# Patient Record
Sex: MALE | Race: BLACK OR AFRICAN AMERICAN | Employment: UNEMPLOYED | URBAN - METROPOLITAN AREA
[De-identification: names, ages, dates, MRNs, and addresses within clinical notes are randomized per-mention and may not be internally consistent; named-entity substitution may affect disease eponyms.]

---

## 2018-08-29 PROBLEM — I50.40 CHF (CONGESTIVE HEART FAILURE), NYHA CLASS III, UNSPECIFIED FAILURE CHRONICITY, COMBINED (HCC): Status: ACTIVE | Noted: 2018-08-29

## 2018-09-11 ENCOUNTER — TELEPHONE (OUTPATIENT)
Dept: CARDIOLOGY CLINIC | Age: 63
End: 2018-09-11

## 2018-09-11 NOTE — TELEPHONE ENCOUNTER
Returned call to Samia Sosa I am not sure what test the patient is talking about will talk with Dr. Teofilo Wu in am to see what the testing is and give Samia Sosa a call back tomorrow. Patient verbalized understanding of all information given.

## 2018-09-11 NOTE — TELEPHONE ENCOUNTER
Patients son called stating Dr. Madhavi Gregory wanted a test done on his father. He is not sure what kind of procedure. The son stated he does not go to his fathers doctor appointments much. They were told they would have to go to [address] [place] away and they were wanting to try and get somewhere closer.  You can reach Shanna Christiansen (the patient) at [phone] his home number

## 2018-09-26 ENCOUNTER — OFFICE VISIT (OUTPATIENT)
Dept: CARDIOLOGY CLINIC | Age: 63
End: 2018-09-26
Payer: COMMERCIAL

## 2018-09-26 VITALS
HEIGHT: 64 IN | BODY MASS INDEX: 18.44 KG/M2 | HEART RATE: 68 BPM | WEIGHT: 108 LBS | SYSTOLIC BLOOD PRESSURE: 124 MMHG | DIASTOLIC BLOOD PRESSURE: 76 MMHG | OXYGEN SATURATION: 96 % | RESPIRATION RATE: 12 BRPM

## 2018-09-26 DIAGNOSIS — I10 ESSENTIAL HYPERTENSION: ICD-10-CM

## 2018-09-26 DIAGNOSIS — E78.5 DYSLIPIDEMIA: Primary | ICD-10-CM

## 2018-09-26 PROCEDURE — 99214 OFFICE O/P EST MOD 30 MIN: CPT | Performed by: INTERNAL MEDICINE

## 2018-09-26 NOTE — PROGRESS NOTES
Tanya Gallardo MD        OFFICE  FOLLOWUP NOTE    Chief complaints: patient is here for management of cardiomyopathy, RVH, pulmonary HTN, hep c, cirrhosis  Subjective: patient feels better, no chest pain, no shortness of breath, no dizziness, no palpitations    HPI Mehnaz Liriano is a 70 y. o.year old who  has a past medical history of Hepatitis C without hepatic coma; Hyperlipidemia; and Hypertension. and presents for management of cardiomyopathy, RVH, pulmonary HTN, hep c, cirrhosis which are well controlled      Current Outpatient Prescriptions   Medication Sig Dispense Refill    furosemide (LASIX) 40 MG tablet Take 1 tablet by mouth daily 30 tablet 0    potassium chloride (KLOR-CON M) 20 MEQ extended release tablet Take 1 tablet by mouth daily 30 tablet 0    nicotine (NICODERM CQ) 14 MG/24HR Place 1 patch onto the skin daily 30 patch 0    cloNIDine (CATAPRES) 0.2 MG tablet Take 0.2 mg by mouth every 8 hours      lisinopril (PRINIVIL;ZESTRIL) 40 MG tablet Take 40 mg by mouth daily      metoprolol tartrate (LOPRESSOR) 50 MG tablet Take 50 mg by mouth 2 times daily      aspirin 81 MG EC tablet Take 81 mg by mouth daily       No current facility-administered medications for this visit. Allergies: Morphine  Past Medical History:   Diagnosis Date    Hepatitis C without hepatic coma     Hyperlipidemia     Hypertension      History reviewed. No pertinent surgical history.   Family History   Problem Relation Age of Onset    Hypertension Mother     Diabetes Sister     Hypertension Brother      Social History   Substance Use Topics    Smoking status: Current Every Day Smoker     Types: Cigarettes    Smokeless tobacco: Never Used      Comment: 8 cigarettes/day    Alcohol use No      [unfilled]  Review of Systems:   · Constitutional: No Fever or Weight Loss   · Eyes: No Decreased Vision  · ENT: No Headaches, Hearing Loss or Vertigo  · Cardiovascular: No chest pain, dyspnea on exertion, significant edema, no varicose veins    Abdomen - No masses, tenderness, or organomegaly, no hepato-splenomegally, no bruits  Musculoskeletal - No significant edema, no kyphosis or scoliosis, no deformity in any extremity noted, muscle strength and tone are normal  Skin: no ulcer,no scar,no stasis dermatitis   Neurologic - alert oriented times 3,Cranial nerves II through XII are grossly intact. There were no gross focal neurologic abnormalities. All sensory and motor nerves examined and were normal  Psychiatric: normal mood and affect    No results found for: CKTOTAL, CKMB, CKMBINDEX, TROPONINI  BNP:  No results found for: BNP  PT/INR:  No results found for: PTINR  No results found for: LABA1C  Lab Results   Component Value Date    CHOL 93 08/30/2018    TRIG 52 08/30/2018    HDL 32 (L) 08/30/2018    LDLDIRECT 55 08/30/2018     Lab Results   Component Value Date    ALT 11 09/01/2018    AST 23 09/01/2018     TSH:  No results found for: TSH    Impression:  Margie Jim is a 70 y. o.year old who  has a past medical history of Hepatitis C without hepatic coma; Hyperlipidemia; and Hypertension. and presents with     Plan:  1. Shortness of breath: severe pulmonary htn, WILL GET RHC/LHC  2. CARDIOMYOPAHTY: LHC scheduled  3. HTN; stable  4. Dyslipidemia: stable  5. Hep c: stable  6. cirrhosis  1. Health maintenance: exerise and diet  All labs, medications and tests reviewed, continue all other medications of all above medical condition listed as is.     @[facility]@

## 2018-09-27 ENCOUNTER — TELEPHONE (OUTPATIENT)
Dept: CARDIOLOGY CLINIC | Age: 63
End: 2018-09-27

## 2018-10-22 ENCOUNTER — HOSPITAL ENCOUNTER (OUTPATIENT)
Age: 63
Discharge: HOME | End: 2018-10-22
Payer: COMMERCIAL

## 2018-10-22 LAB
ANION GAP SERPL CALCULATED.3IONS-SCNC: 13 MMOL/L (ref 4–16)
APTT: 26 SECONDS (ref 21.2–33)
BASOPHILS ABSOLUTE: 0.1 K/CU MM
BASOPHILS RELATIVE PERCENT: 0.7 % (ref 0–1)
BUN BLDV-MCNC: 11 MG/DL (ref 6–23)
CALCIUM SERPL-MCNC: 9.3 MG/DL (ref 8.3–10.6)
CHLORIDE BLD-SCNC: 101 MMOL/L (ref 99–110)
CO2: 25 MMOL/L (ref 21–32)
CREAT SERPL-MCNC: 0.7 MG/DL (ref 0.9–1.3)
DIFFERENTIAL TYPE: ABNORMAL
EOSINOPHILS ABSOLUTE: 0.3 K/CU MM
EOSINOPHILS RELATIVE PERCENT: 3.7 % (ref 0–3)
GFR AFRICAN AMERICAN: >60 ML/MIN/1.73M2
GFR NON-AFRICAN AMERICAN: >60 ML/MIN/1.73M2
GLUCOSE BLD-MCNC: 133 MG/DL (ref 70–99)
HCT VFR BLD CALC: 53.4 % (ref 42–52)
HEMOGLOBIN: 17 GM/DL (ref 13.5–18)
IMMATURE NEUTROPHIL %: 0 % (ref 0–0.43)
INR BLD: 1.03 INDEX
LYMPHOCYTES ABSOLUTE: 3 K/CU MM
LYMPHOCYTES RELATIVE PERCENT: 44.5 % (ref 24–44)
MCH RBC QN AUTO: 31.7 PG (ref 27–31)
MCHC RBC AUTO-ENTMCNC: 31.8 % (ref 32–36)
MCV RBC AUTO: 99.6 FL (ref 78–100)
MONOCYTES ABSOLUTE: 0.5 K/CU MM
MONOCYTES RELATIVE PERCENT: 7.4 % (ref 0–4)
NUCLEATED RBC %: 0 %
PDW BLD-RTO: 12.2 % (ref 11.7–14.9)
PLATELET # BLD: 163 K/CU MM (ref 140–440)
PMV BLD AUTO: 11 FL (ref 7.5–11.1)
POTASSIUM SERPL-SCNC: 4.7 MMOL/L (ref 3.5–5.1)
PROTHROMBIN TIME: 11.7 SECONDS (ref 9.12–12.5)
RBC # BLD: 5.36 M/CU MM (ref 4.6–6.2)
SEGMENTED NEUTROPHILS ABSOLUTE COUNT: 2.9 K/CU MM
SEGMENTED NEUTROPHILS RELATIVE PERCENT: 43.7 % (ref 36–66)
SODIUM BLD-SCNC: 139 MMOL/L (ref 135–145)
TOTAL IMMATURE NEUTOROPHIL: 0 K/CU MM
TOTAL NUCLEATED RBC: 0 K/CU MM
WBC # BLD: 6.7 K/CU MM (ref 4–10.5)

## 2018-10-22 PROCEDURE — 85610 PROTHROMBIN TIME: CPT

## 2018-10-22 PROCEDURE — 36415 COLL VENOUS BLD VENIPUNCTURE: CPT

## 2018-10-22 PROCEDURE — 86850 RBC ANTIBODY SCREEN: CPT

## 2018-10-22 PROCEDURE — 86901 BLOOD TYPING SEROLOGIC RH(D): CPT

## 2018-10-22 PROCEDURE — 80048 BASIC METABOLIC PNL TOTAL CA: CPT

## 2018-10-22 PROCEDURE — 85730 THROMBOPLASTIN TIME PARTIAL: CPT

## 2018-10-22 PROCEDURE — 85025 COMPLETE CBC W/AUTO DIFF WBC: CPT

## 2018-10-22 PROCEDURE — 86900 BLOOD TYPING SEROLOGIC ABO: CPT

## 2018-10-25 ENCOUNTER — APPOINTMENT (OUTPATIENT)
Dept: GENERAL RADIOLOGY | Age: 63
End: 2018-10-25
Attending: INTERNAL MEDICINE
Payer: COMMERCIAL

## 2018-10-25 ENCOUNTER — HOSPITAL ENCOUNTER (OUTPATIENT)
Dept: CARDIAC CATH/INVASIVE PROCEDURES | Age: 63
Setting detail: OBSERVATION
Discharge: HOME | End: 2018-10-26
Attending: INTERNAL MEDICINE | Admitting: INTERNAL MEDICINE
Payer: COMMERCIAL

## 2018-10-25 PROBLEM — R09.02 HYPOXEMIA: Status: ACTIVE | Noted: 2018-10-25

## 2018-10-25 PROBLEM — R09.02 HYPOXIA: Status: ACTIVE | Noted: 2018-10-25

## 2018-10-25 LAB
ALBUMIN SERPL-MCNC: 4.1 GM/DL (ref 3.4–5)
ALP BLD-CCNC: 88 IU/L (ref 40–129)
ALT SERPL-CCNC: 10 U/L (ref 10–40)
ANION GAP SERPL CALCULATED.3IONS-SCNC: 9 MMOL/L (ref 4–16)
AST SERPL-CCNC: 21 IU/L (ref 15–37)
BASE EXCESS MIXED: 2.1 (ref 0–1.2)
BASOPHILS ABSOLUTE: 0 K/CU MM
BASOPHILS RELATIVE PERCENT: 0.6 % (ref 0–1)
BILIRUB SERPL-MCNC: 0.7 MG/DL (ref 0–1)
BUN BLDV-MCNC: 10 MG/DL (ref 6–23)
CALCIUM SERPL-MCNC: 9.3 MG/DL (ref 8.3–10.6)
CARBON MONOXIDE, BLOOD: 2.4 % (ref 0–5)
CHLORIDE BLD-SCNC: 102 MMOL/L (ref 99–110)
CO2 CONTENT: 30.3 MMOL/L (ref 19–24)
CO2: 29 MMOL/L (ref 21–32)
CREAT SERPL-MCNC: 0.7 MG/DL (ref 0.9–1.3)
DIFFERENTIAL TYPE: ABNORMAL
EOSINOPHILS ABSOLUTE: 0.3 K/CU MM
EOSINOPHILS RELATIVE PERCENT: 3.9 % (ref 0–3)
GFR AFRICAN AMERICAN: >60 ML/MIN/1.73M2
GFR NON-AFRICAN AMERICAN: >60 ML/MIN/1.73M2
GLUCOSE BLD-MCNC: 90 MG/DL (ref 70–99)
HCO3 ARTERIAL: 28.7 MMOL/L (ref 18–23)
HCT VFR BLD CALC: 54.9 % (ref 42–52)
HEMOGLOBIN: 16.7 GM/DL (ref 13.5–18)
IMMATURE NEUTROPHIL %: 0.2 % (ref 0–0.43)
LYMPHOCYTES ABSOLUTE: 3.7 K/CU MM
LYMPHOCYTES RELATIVE PERCENT: 55.8 % (ref 24–44)
MAGNESIUM: 1.8 MG/DL (ref 1.8–2.4)
MCH RBC QN AUTO: 31.5 PG (ref 27–31)
MCHC RBC AUTO-ENTMCNC: 30.4 % (ref 32–36)
MCV RBC AUTO: 103.6 FL (ref 78–100)
METHEMOGLOBIN ARTERIAL: 1.1 %
MONOCYTES ABSOLUTE: 0.6 K/CU MM
MONOCYTES RELATIVE PERCENT: 8.6 % (ref 0–4)
NUCLEATED RBC %: 0 %
O2 SATURATION: 79 % (ref 96–97)
PCO2 ARTERIAL: 52 MMHG (ref 32–45)
PDW BLD-RTO: 12.4 % (ref 11.7–14.9)
PH BLOOD: 7.35 (ref 7.34–7.45)
PLATELET # BLD: 150 K/CU MM (ref 140–440)
PMV BLD AUTO: 10.5 FL (ref 7.5–11.1)
PO2 ARTERIAL: 46 MMHG (ref 75–100)
POTASSIUM SERPL-SCNC: 5.2 MMOL/L (ref 3.5–5.1)
PRO-BNP: 1668 PG/ML
RBC # BLD: 5.3 M/CU MM (ref 4.6–6.2)
SEGMENTED NEUTROPHILS ABSOLUTE COUNT: 2.1 K/CU MM
SEGMENTED NEUTROPHILS RELATIVE PERCENT: 30.9 % (ref 36–66)
SODIUM BLD-SCNC: 140 MMOL/L (ref 135–145)
TOTAL IMMATURE NEUTOROPHIL: 0.01 K/CU MM
TOTAL NUCLEATED RBC: 0 K/CU MM
TOTAL PROTEIN: 8 GM/DL (ref 6.4–8.2)
WBC # BLD: 6.7 K/CU MM (ref 4–10.5)

## 2018-10-25 PROCEDURE — 2580000003 HC RX 258: Performed by: PHYSICIAN ASSISTANT

## 2018-10-25 PROCEDURE — 93453 R&L HRT CATH W/VENTRICLGRPHY: CPT | Performed by: INTERNAL MEDICINE

## 2018-10-25 PROCEDURE — 99215 OFFICE O/P EST HI 40 MIN: CPT | Performed by: INTERNAL MEDICINE

## 2018-10-25 PROCEDURE — 6360000002 HC RX W HCPCS

## 2018-10-25 PROCEDURE — 2580000003 HC RX 258: Performed by: INTERNAL MEDICINE

## 2018-10-25 PROCEDURE — 99024 POSTOP FOLLOW-UP VISIT: CPT | Performed by: INTERNAL MEDICINE

## 2018-10-25 PROCEDURE — 83735 ASSAY OF MAGNESIUM: CPT

## 2018-10-25 PROCEDURE — 36415 COLL VENOUS BLD VENIPUNCTURE: CPT

## 2018-10-25 PROCEDURE — 82803 BLOOD GASES ANY COMBINATION: CPT

## 2018-10-25 PROCEDURE — C1769 GUIDE WIRE: HCPCS

## 2018-10-25 PROCEDURE — 6370000000 HC RX 637 (ALT 250 FOR IP): Performed by: PHYSICIAN ASSISTANT

## 2018-10-25 PROCEDURE — 6360000002 HC RX W HCPCS: Performed by: PHYSICIAN ASSISTANT

## 2018-10-25 PROCEDURE — 93460 R&L HRT ART/VENTRICLE ANGIO: CPT | Performed by: INTERNAL MEDICINE

## 2018-10-25 PROCEDURE — 6370000000 HC RX 637 (ALT 250 FOR IP): Performed by: INTERNAL MEDICINE

## 2018-10-25 PROCEDURE — C1894 INTRO/SHEATH, NON-LASER: HCPCS

## 2018-10-25 PROCEDURE — 6360000004 HC RX CONTRAST MEDICATION

## 2018-10-25 PROCEDURE — 80053 COMPREHEN METABOLIC PANEL: CPT

## 2018-10-25 PROCEDURE — 83880 ASSAY OF NATRIURETIC PEPTIDE: CPT

## 2018-10-25 PROCEDURE — G0378 HOSPITAL OBSERVATION PER HR: HCPCS

## 2018-10-25 PROCEDURE — 2709999900 HC NON-CHARGEABLE SUPPLY

## 2018-10-25 PROCEDURE — 85025 COMPLETE CBC W/AUTO DIFF WBC: CPT

## 2018-10-25 PROCEDURE — 71046 X-RAY EXAM CHEST 2 VIEWS: CPT

## 2018-10-25 RX ORDER — POTASSIUM CHLORIDE 20 MEQ/1
20 TABLET, EXTENDED RELEASE ORAL DAILY
Status: DISCONTINUED | OUTPATIENT
Start: 2018-10-25 | End: 2018-10-26 | Stop reason: HOSPADM

## 2018-10-25 RX ORDER — ONDANSETRON 2 MG/ML
4 INJECTION INTRAMUSCULAR; INTRAVENOUS EVERY 6 HOURS PRN
Status: DISCONTINUED | OUTPATIENT
Start: 2018-10-25 | End: 2018-10-26 | Stop reason: HOSPADM

## 2018-10-25 RX ORDER — IPRATROPIUM BROMIDE AND ALBUTEROL SULFATE 2.5; .5 MG/3ML; MG/3ML
1 SOLUTION RESPIRATORY (INHALATION) EVERY 4 HOURS PRN
Status: DISCONTINUED | OUTPATIENT
Start: 2018-10-25 | End: 2018-10-26 | Stop reason: HOSPADM

## 2018-10-25 RX ORDER — POTASSIUM CHLORIDE 20 MEQ/1
40 TABLET, EXTENDED RELEASE ORAL PRN
Status: DISCONTINUED | OUTPATIENT
Start: 2018-10-25 | End: 2018-10-26 | Stop reason: HOSPADM

## 2018-10-25 RX ORDER — CLONIDINE HYDROCHLORIDE 0.2 MG/1
0.2 TABLET ORAL EVERY 8 HOURS
Status: DISCONTINUED | OUTPATIENT
Start: 2018-10-25 | End: 2018-10-26 | Stop reason: HOSPADM

## 2018-10-25 RX ORDER — POTASSIUM CHLORIDE 20MEQ/15ML
40 LIQUID (ML) ORAL PRN
Status: DISCONTINUED | OUTPATIENT
Start: 2018-10-25 | End: 2018-10-26 | Stop reason: HOSPADM

## 2018-10-25 RX ORDER — ACETAMINOPHEN 325 MG/1
650 TABLET ORAL EVERY 4 HOURS PRN
Status: DISCONTINUED | OUTPATIENT
Start: 2018-10-25 | End: 2018-10-26 | Stop reason: HOSPADM

## 2018-10-25 RX ORDER — SODIUM CHLORIDE 0.9 % (FLUSH) 0.9 %
10 SYRINGE (ML) INJECTION PRN
Status: DISCONTINUED | OUTPATIENT
Start: 2018-10-25 | End: 2018-10-26 | Stop reason: HOSPADM

## 2018-10-25 RX ORDER — MAGNESIUM SULFATE 1 G/100ML
1 INJECTION INTRAVENOUS PRN
Status: DISCONTINUED | OUTPATIENT
Start: 2018-10-25 | End: 2018-10-26 | Stop reason: HOSPADM

## 2018-10-25 RX ORDER — SODIUM CHLORIDE 9 MG/ML
INJECTION, SOLUTION INTRAVENOUS CONTINUOUS
Status: DISCONTINUED | OUTPATIENT
Start: 2018-10-25 | End: 2018-10-26

## 2018-10-25 RX ORDER — FUROSEMIDE 40 MG/1
40 TABLET ORAL DAILY
Status: DISCONTINUED | OUTPATIENT
Start: 2018-10-25 | End: 2018-10-26 | Stop reason: HOSPADM

## 2018-10-25 RX ORDER — DIPHENHYDRAMINE HCL 25 MG
25 TABLET ORAL ONCE
Status: COMPLETED | OUTPATIENT
Start: 2018-10-25 | End: 2018-10-25

## 2018-10-25 RX ORDER — METOPROLOL TARTRATE 50 MG/1
50 TABLET, FILM COATED ORAL 2 TIMES DAILY
Status: DISCONTINUED | OUTPATIENT
Start: 2018-10-25 | End: 2018-10-26 | Stop reason: HOSPADM

## 2018-10-25 RX ORDER — POTASSIUM CHLORIDE 7.45 MG/ML
10 INJECTION INTRAVENOUS PRN
Status: DISCONTINUED | OUTPATIENT
Start: 2018-10-25 | End: 2018-10-26 | Stop reason: HOSPADM

## 2018-10-25 RX ORDER — NICOTINE 21 MG/24HR
1 PATCH, TRANSDERMAL 24 HOURS TRANSDERMAL DAILY
Status: DISCONTINUED | OUTPATIENT
Start: 2018-10-25 | End: 2018-10-26 | Stop reason: HOSPADM

## 2018-10-25 RX ORDER — LISINOPRIL 40 MG/1
40 TABLET ORAL DAILY
Status: DISCONTINUED | OUTPATIENT
Start: 2018-10-25 | End: 2018-10-26 | Stop reason: HOSPADM

## 2018-10-25 RX ORDER — LANOLIN ALCOHOL/MO/W.PET/CERES
9 CREAM (GRAM) TOPICAL NIGHTLY PRN
Status: DISCONTINUED | OUTPATIENT
Start: 2018-10-25 | End: 2018-10-26 | Stop reason: HOSPADM

## 2018-10-25 RX ORDER — SODIUM CHLORIDE 0.9 % (FLUSH) 0.9 %
10 SYRINGE (ML) INJECTION EVERY 12 HOURS SCHEDULED
Status: DISCONTINUED | OUTPATIENT
Start: 2018-10-25 | End: 2018-10-26 | Stop reason: HOSPADM

## 2018-10-25 RX ORDER — ASPIRIN 81 MG/1
81 TABLET ORAL DAILY
Status: DISCONTINUED | OUTPATIENT
Start: 2018-10-25 | End: 2018-10-26 | Stop reason: HOSPADM

## 2018-10-25 RX ORDER — DIAZEPAM 5 MG/1
5 TABLET ORAL ONCE
Status: COMPLETED | OUTPATIENT
Start: 2018-10-25 | End: 2018-10-25

## 2018-10-25 RX ADMIN — DIPHENHYDRAMINE HCL 25 MG: 25 TABLET ORAL at 10:24

## 2018-10-25 RX ADMIN — SODIUM CHLORIDE, PRESERVATIVE FREE 10 ML: 5 INJECTION INTRAVENOUS at 20:23

## 2018-10-25 RX ADMIN — CLONIDINE HYDROCHLORIDE 0.2 MG: 0.2 TABLET ORAL at 17:52

## 2018-10-25 RX ADMIN — SODIUM CHLORIDE: 9 INJECTION, SOLUTION INTRAVENOUS at 10:24

## 2018-10-25 RX ADMIN — DIAZEPAM 5 MG: 5 TABLET ORAL at 10:24

## 2018-10-25 RX ADMIN — ENOXAPARIN SODIUM 40 MG: 40 INJECTION SUBCUTANEOUS at 17:52

## 2018-10-25 RX ADMIN — METOPROLOL TARTRATE 50 MG: 50 TABLET ORAL at 20:23

## 2018-10-25 NOTE — CONSULTS
(10%) oral solution 40 mEq PRN   Or    potassium chloride 10 mEq/100 mL IVPB (Peripheral Line) PRN   magnesium sulfate 1 g in dextrose 5% 100 mL IVPB PRN   acetaminophen (TYLENOL) tablet 650 mg Q4H PRN   ipratropium-albuterol (DUONEB) nebulizer solution 1 ampule Q4H PRN   melatonin tablet 9 mg Nightly PRN     Current Facility-Administered Medications   Medication Dose Route Frequency Provider Last Rate Last Dose    0.9 % sodium chloride infusion   Intravenous Continuous Hyland Duverney, MD 75 mL/hr at 10/25/18 1024      aspirin EC tablet 81 mg  81 mg Oral Daily Ginny L Pacer, PA-C        cloNIDine (CATAPRES) tablet 0.2 mg  0.2 mg Oral Q8H Ginny L Pacer, PA-C        furosemide (LASIX) tablet 40 mg  40 mg Oral Daily Ginny L Pacer, PA-C        metoprolol tartrate (LOPRESSOR) tablet 50 mg  50 mg Oral BID Ginny L Pacer, PA-C        lisinopril (PRINIVIL;ZESTRIL) tablet 40 mg  40 mg Oral Daily Ginny L Pacer, PA-C        nicotine (NICODERM CQ) 14 MG/24HR 1 patch  1 patch Transdermal Daily Ginny L Pacer, PA-C        potassium chloride (KLOR-CON M) extended release tablet 20 mEq  20 mEq Oral Daily Ginny L Pacer, PA-C        sodium chloride flush 0.9 % injection 10 mL  10 mL Intravenous 2 times per day Ginny L Pacer, PA-C        sodium chloride flush 0.9 % injection 10 mL  10 mL Intravenous PRN Ginny L Pacer, PA-C        magnesium hydroxide (MILK OF MAGNESIA) 400 MG/5ML suspension 30 mL  30 mL Oral Daily PRN Ginny L Pacer, PA-C        ondansetron (ZOFRAN) injection 4 mg  4 mg Intravenous Q6H PRN Ginny L Pacer, PA-C        enoxaparin (LOVENOX) injection 40 mg  40 mg Subcutaneous Daily Ginny L Pacer, PA-C        potassium chloride (KLOR-CON M) extended release tablet 40 mEq  40 mEq Oral PRN Ginny L Pacer, PA-C        Or    potassium chloride 20 MEQ/15ML (10%) oral solution 40 mEq  40 mEq Oral PRN Ginny L Pacer, PA-C        Or    potassium chloride 10 mEq/100 mL IVPB (Peripheral Line)  10 mEq bruit  Eyes:  PERRL, EOMI, Conjunctiva normal, No discharge. Respiratory:  Normal breath sounds, No respiratory distress, No wheezing, No chest tenderness. ,no use of accessory muscles, diaphragm movement is normal  Cardiovascular: (PMI) apex non displaced,no lifts no thrills, no s3,no s4, Normal heart rate, Normal rhythm, No murmurs, No rubs, No gallops. Carotid arteries pulse and amplitude are normal no bruit, no abdominal bruit noted ( normal abdominal aorta ausculation), femoral arteries pulse and amplitude are normal no bruit, pedal pulses are normal  GI:  Bowel sounds normal, Soft, No tenderness, No masses, No pulsatile masses, no hepatosplenomegally, no bruits  : External genitalia appear normal, No masses or lesions. No discharge. No CVA tenderness. Musculoskeletal:  Intact distal pulses, No edema, No tenderness, No cyanosis, No clubbing. Good range of motion in all major joints. No tenderness to palpation or major deformities noted. Back- No tenderness. Integument:  Warm, Dry, No erythema, No rash. Skin: no rash, no ulcers  Lymphatic:  No lymphadenopathy noted. Neurologic:  Alert & oriented x 3, Normal motor function, Normal sensory function, No focal deficits noted. Psychiatric:  Affect normal, Judgment normal, Mood normal.   Lab Review   No results for input(s): WBC, HGB, HCT, PLT in the last 72 hours. No results for input(s): NA, K, CL, CO2, PHOS, BUN, CREATININE in the last 72 hours. Invalid input(s): CA  No results for input(s): AST, ALT, ALB, BILIDIR, BILITOT, ALKPHOS in the last 72 hours. No results for input(s): TROPONINI in the last 72 hours. No results found for: BNP  Lab Results   Component Value Date    INR 1.03 10/22/2018    PROTIME 11.7 10/22/2018         EKG:pending    Chest Xray:pending    ECHO: LVEF 35% IN 8/18  Labs, echo, meds reviewed  Assessment: 70 y. o.year old with PMH of  has a past medical history of Hepatitis C without hepatic coma;  Hyperlipidemia; and

## 2018-10-26 ENCOUNTER — APPOINTMENT (OUTPATIENT)
Dept: CT IMAGING | Age: 63
End: 2018-10-26
Attending: INTERNAL MEDICINE
Payer: COMMERCIAL

## 2018-10-26 VITALS
SYSTOLIC BLOOD PRESSURE: 125 MMHG | BODY MASS INDEX: 20.16 KG/M2 | RESPIRATION RATE: 15 BRPM | DIASTOLIC BLOOD PRESSURE: 102 MMHG | HEART RATE: 75 BPM | WEIGHT: 118.1 LBS | OXYGEN SATURATION: 97 % | TEMPERATURE: 97.7 F | HEIGHT: 64 IN

## 2018-10-26 LAB
ANION GAP SERPL CALCULATED.3IONS-SCNC: 7 MMOL/L (ref 4–16)
BUN BLDV-MCNC: 10 MG/DL (ref 6–23)
CALCIUM SERPL-MCNC: 8.9 MG/DL (ref 8.3–10.6)
CHLORIDE BLD-SCNC: 103 MMOL/L (ref 99–110)
CO2: 29 MMOL/L (ref 21–32)
CREAT SERPL-MCNC: 0.8 MG/DL (ref 0.9–1.3)
GFR AFRICAN AMERICAN: >60 ML/MIN/1.73M2
GFR NON-AFRICAN AMERICAN: >60 ML/MIN/1.73M2
GLUCOSE BLD-MCNC: 85 MG/DL (ref 70–99)
HCT VFR BLD CALC: 47.2 % (ref 42–52)
HEMOGLOBIN: 15 GM/DL (ref 13.5–18)
MCH RBC QN AUTO: 31.4 PG (ref 27–31)
MCHC RBC AUTO-ENTMCNC: 31.8 % (ref 32–36)
MCV RBC AUTO: 99 FL (ref 78–100)
PDW BLD-RTO: 12.1 % (ref 11.7–14.9)
PLATELET # BLD: 124 K/CU MM (ref 140–440)
PMV BLD AUTO: 10.6 FL (ref 7.5–11.1)
POTASSIUM SERPL-SCNC: 4.4 MMOL/L (ref 3.5–5.1)
RBC # BLD: 4.77 M/CU MM (ref 4.6–6.2)
SODIUM BLD-SCNC: 139 MMOL/L (ref 135–145)
WBC # BLD: 5.7 K/CU MM (ref 4–10.5)

## 2018-10-26 PROCEDURE — 2580000003 HC RX 258: Performed by: INTERNAL MEDICINE

## 2018-10-26 PROCEDURE — 99219 PR INITIAL OBSERVATION CARE/DAY 50 MINUTES: CPT | Performed by: INTERNAL MEDICINE

## 2018-10-26 PROCEDURE — 90670 PCV13 VACCINE IM: CPT | Performed by: INTERNAL MEDICINE

## 2018-10-26 PROCEDURE — 2700000000 HC OXYGEN THERAPY PER DAY

## 2018-10-26 PROCEDURE — 36415 COLL VENOUS BLD VENIPUNCTURE: CPT

## 2018-10-26 PROCEDURE — 6370000000 HC RX 637 (ALT 250 FOR IP): Performed by: PHYSICIAN ASSISTANT

## 2018-10-26 PROCEDURE — 94761 N-INVAS EAR/PLS OXIMETRY MLT: CPT

## 2018-10-26 PROCEDURE — 99213 OFFICE O/P EST LOW 20 MIN: CPT | Performed by: INTERNAL MEDICINE

## 2018-10-26 PROCEDURE — 96372 THER/PROPH/DIAG INJ SC/IM: CPT

## 2018-10-26 PROCEDURE — G0009 ADMIN PNEUMOCOCCAL VACCINE: HCPCS | Performed by: INTERNAL MEDICINE

## 2018-10-26 PROCEDURE — 6360000002 HC RX W HCPCS: Performed by: PHYSICIAN ASSISTANT

## 2018-10-26 PROCEDURE — G0378 HOSPITAL OBSERVATION PER HR: HCPCS

## 2018-10-26 PROCEDURE — 85027 COMPLETE CBC AUTOMATED: CPT

## 2018-10-26 PROCEDURE — 71250 CT THORAX DX C-: CPT

## 2018-10-26 PROCEDURE — 80048 BASIC METABOLIC PNL TOTAL CA: CPT

## 2018-10-26 PROCEDURE — 6360000002 HC RX W HCPCS: Performed by: INTERNAL MEDICINE

## 2018-10-26 RX ADMIN — ASPIRIN 81 MG: 81 TABLET, COATED ORAL at 08:09

## 2018-10-26 RX ADMIN — POTASSIUM CHLORIDE 20 MEQ: 20 TABLET, EXTENDED RELEASE ORAL at 08:09

## 2018-10-26 RX ADMIN — PNEUMOCOCCAL 13-VALENT CONJUGATE VACCINE 0.5 ML: 2.2; 2.2; 2.2; 2.2; 2.2; 4.4; 2.2; 2.2; 2.2; 2.2; 2.2; 2.2; 2.2 INJECTION, SUSPENSION INTRAMUSCULAR at 11:18

## 2018-10-26 RX ADMIN — CLONIDINE HYDROCHLORIDE 0.2 MG: 0.2 TABLET ORAL at 08:09

## 2018-10-26 RX ADMIN — FUROSEMIDE 40 MG: 40 TABLET ORAL at 08:09

## 2018-10-26 RX ADMIN — LISINOPRIL 40 MG: 40 TABLET ORAL at 08:09

## 2018-10-26 RX ADMIN — SODIUM CHLORIDE: 9 INJECTION, SOLUTION INTRAVENOUS at 04:18

## 2018-10-26 RX ADMIN — METOPROLOL TARTRATE 50 MG: 50 TABLET ORAL at 08:09

## 2018-10-26 RX ADMIN — ENOXAPARIN SODIUM 40 MG: 40 INJECTION SUBCUTANEOUS at 08:10

## 2018-10-26 NOTE — PROGRESS NOTES
noted ( normal abdominal aorta ausculation), femoral arteries pulse and amplitude are normal no bruit, pedal pulses are normal  GI:  Bowel sounds normal, Soft, No tenderness, No masses, No pulsatile masses. : External genitalia appear normal, No masses or lesions. No discharge. No CVA tenderness. Musculoskeletal:  Intact distal pulses, No edema, No tenderness, No cyanosis, No clubbing. Good range of motion in all major joints. No tenderness to palpation or major deformities noted. Back- No tenderness. Integument:  Warm, Dry, No erythema, No rash. Lymphatic:  No lymphadenopathy noted. Neurologic:  Alert & oriented x 3, Normal motor function, Normal sensory function, No focal deficits noted. Psychiatric:  Affect normal, Judgment normal, Mood normal.     Medications:    aspirin  81 mg Oral Daily    cloNIDine  0.2 mg Oral Q8H    furosemide  40 mg Oral Daily    metoprolol tartrate  50 mg Oral BID    lisinopril  40 mg Oral Daily    nicotine  1 patch Transdermal Daily    potassium chloride  20 mEq Oral Daily    sodium chloride flush  10 mL Intravenous 2 times per day    enoxaparin  40 mg Subcutaneous Daily       sodium chloride flush, magnesium hydroxide, ondansetron, potassium chloride **OR** potassium chloride **OR** potassium chloride, magnesium sulfate, acetaminophen, ipratropium-albuterol, melatonin    Lab Data:  CBC: Recent Labs      10/25/18   1752  10/26/18   0418   WBC  6.7  5.7   HGB  16.7  15.0   HCT  54.9*  47.2   MCV  103.6*  99.0   PLT  150  124*     BMP: Recent Labs      10/25/18   1752  10/26/18   0418   NA  140  139   K  5.2*  4.4   CL  102  103   CO2  29  29   BUN  10  10   CREATININE  0.7*  0.8*     LIVER PROFILE:   Recent Labs      10/25/18   1752   AST  21   ALT  10   BILITOT  0.7   ALKPHOS  88     PT/INR: No results for input(s): PROTIME, INR in the last 72 hours. APTT: No results for input(s): APTT in the last 72 hours.   BNP:  No results for input(s): BNP in the last 72

## 2018-10-26 NOTE — CONSULTS
Min: 94 %  Max: 99 %    CONSTITUTIONAL:  awake, alert, cooperative, no apparent distress, and appears stated age  NECK:  Supple, symmetrical, trachea midline, no adenopathy, thyroid symmetric, not enlarged and no tenderness, skin normal  LUNGS:  Occasional basal crackles  CARDIOVASCULAR:  normal S1 and S2, no edema and no JVD  ABDOMEN:  normal bowel sounds, non-distended and no masses palpated, and no tenderness to palpation. No hepatospleenomegaly  LYMPHADENOPATHY:  no axillary or supraclavicular adenopathy. No cervical adnenopathy  PSYCHIATRIC: Oriented to person place and time. No obvious depression or anxiety. MUSCULOSKELETAL: No obvious misalignment or effusion of the joints. No clubbing, cyanosis of the digits. SKIN:  normal skin color, texture, turgor and no redness, warmth, or swelling.  No palpable nodules    DATA:    Old records have been reviewed  CBC with Differential:  Lab Results   Component Value Date    WBC 5.7 10/26/2018    RBC 4.77 10/26/2018    HGB 15.0 10/26/2018    HCT 47.2 10/26/2018     10/26/2018    MCV 99.0 10/26/2018    MCH 31.4 10/26/2018    MCHC 31.8 10/26/2018    RDW 12.1 10/26/2018    SEGSPCT 30.9 10/25/2018    LYMPHOPCT 55.8 10/25/2018    MONOPCT 8.6 10/25/2018    BASOPCT 0.6 10/25/2018    MONOSABS 0.6 10/25/2018    LYMPHSABS 3.7 10/25/2018    EOSABS 0.3 10/25/2018    BASOSABS 0.0 10/25/2018    DIFFTYPE AUTOMATED DIFFERENTIAL 10/25/2018     BMP:  Lab Results   Component Value Date     10/26/2018    K 4.4 10/26/2018     10/26/2018    CO2 29 10/26/2018    BUN 10 10/26/2018    CREATININE 0.8 10/26/2018    CALCIUM 8.9 10/26/2018    GFRAA >60 10/26/2018    LABGLOM >60 10/26/2018    GLUCOSE 85 10/26/2018     Hepatic Function Panel:  Lab Results   Component Value Date    ALKPHOS 88 10/25/2018    ALT 10 10/25/2018    AST 21 10/25/2018    PROT 8.0 10/25/2018    BILITOT 0.7 10/25/2018    BILIDIR 0.2 09/01/2018    IBILI 0.1 09/01/2018     ABG:  Lab Results   Component Value

## 2018-11-14 ENCOUNTER — TELEPHONE (OUTPATIENT)
Dept: SLEEP CENTER | Age: 63
End: 2018-11-14

## 2018-11-15 ENCOUNTER — OFFICE VISIT (OUTPATIENT)
Dept: CARDIOLOGY CLINIC | Age: 63
End: 2018-11-15
Payer: COMMERCIAL

## 2018-11-15 VITALS
HEIGHT: 67 IN | WEIGHT: 109.2 LBS | SYSTOLIC BLOOD PRESSURE: 124 MMHG | HEART RATE: 68 BPM | DIASTOLIC BLOOD PRESSURE: 86 MMHG | BODY MASS INDEX: 17.14 KG/M2

## 2018-11-15 DIAGNOSIS — R06.02 SHORTNESS OF BREATH: Primary | ICD-10-CM

## 2018-11-15 PROCEDURE — 1036F TOBACCO NON-USER: CPT | Performed by: INTERNAL MEDICINE

## 2018-11-15 PROCEDURE — G8427 DOCREV CUR MEDS BY ELIG CLIN: HCPCS | Performed by: INTERNAL MEDICINE

## 2018-11-15 PROCEDURE — 3017F COLORECTAL CA SCREEN DOC REV: CPT | Performed by: INTERNAL MEDICINE

## 2018-11-15 PROCEDURE — G8484 FLU IMMUNIZE NO ADMIN: HCPCS | Performed by: INTERNAL MEDICINE

## 2018-11-15 PROCEDURE — 1101F PT FALLS ASSESS-DOCD LE1/YR: CPT | Performed by: INTERNAL MEDICINE

## 2018-11-15 PROCEDURE — 99214 OFFICE O/P EST MOD 30 MIN: CPT | Performed by: INTERNAL MEDICINE

## 2018-11-15 PROCEDURE — 1123F ACP DISCUSS/DSCN MKR DOCD: CPT | Performed by: INTERNAL MEDICINE

## 2018-11-15 PROCEDURE — G8419 CALC BMI OUT NRM PARAM NOF/U: HCPCS | Performed by: INTERNAL MEDICINE

## 2018-11-15 PROCEDURE — 4040F PNEUMOC VAC/ADMIN/RCVD: CPT | Performed by: INTERNAL MEDICINE

## 2019-05-17 ENCOUNTER — OFFICE VISIT (OUTPATIENT)
Dept: CARDIOLOGY CLINIC | Age: 64
End: 2019-05-17
Payer: COMMERCIAL

## 2019-05-17 VITALS
HEART RATE: 68 BPM | SYSTOLIC BLOOD PRESSURE: 108 MMHG | BODY MASS INDEX: 17.43 KG/M2 | WEIGHT: 104.6 LBS | HEIGHT: 65 IN | DIASTOLIC BLOOD PRESSURE: 82 MMHG

## 2019-05-17 DIAGNOSIS — R06.02 SHORTNESS OF BREATH: Primary | ICD-10-CM

## 2019-05-17 PROCEDURE — 4040F PNEUMOC VAC/ADMIN/RCVD: CPT | Performed by: INTERNAL MEDICINE

## 2019-05-17 PROCEDURE — G8419 CALC BMI OUT NRM PARAM NOF/U: HCPCS | Performed by: INTERNAL MEDICINE

## 2019-05-17 PROCEDURE — 1123F ACP DISCUSS/DSCN MKR DOCD: CPT | Performed by: INTERNAL MEDICINE

## 2019-05-17 PROCEDURE — G8427 DOCREV CUR MEDS BY ELIG CLIN: HCPCS | Performed by: INTERNAL MEDICINE

## 2019-05-17 PROCEDURE — 4004F PT TOBACCO SCREEN RCVD TLK: CPT | Performed by: INTERNAL MEDICINE

## 2019-05-17 PROCEDURE — 3017F COLORECTAL CA SCREEN DOC REV: CPT | Performed by: INTERNAL MEDICINE

## 2019-05-17 PROCEDURE — 99214 OFFICE O/P EST MOD 30 MIN: CPT | Performed by: INTERNAL MEDICINE

## 2019-05-17 NOTE — PROGRESS NOTES
cyanosis, clubbing, or significant edema, no varicose veins    Abdomen - No masses, tenderness, or organomegaly, no hepato-splenomegally, no bruits  Musculoskeletal - No significant edema, no kyphosis or scoliosis, no deformity in any extremity noted, muscle strength and tone are normal  Skin: no ulcer,no scar,no stasis dermatitis   Neurologic - alert oriented times 3,Cranial nerves II through XII are grossly intact. There were no gross focal neurologic abnormalities. All sensory and motor nerves examined and were normal  Psychiatric: normal mood and affect    No results found for: CKTOTAL, CKMB, CKMBINDEX, TROPONINI  BNP:  No results found for: BNP  PT/INR:  No results found for: PTINR  No results found for: LABA1C  Lab Results   Component Value Date    CHOL 93 08/30/2018    TRIG 52 08/30/2018    HDL 32 (L) 08/30/2018    LDLDIRECT 55 08/30/2018     Lab Results   Component Value Date    ALT 10 10/25/2018    AST 21 10/25/2018     TSH:  No results found for: TSH    Impression:  Stefanie Huffman is a 75 y. o.year old who  has a past medical history of Hepatitis C without hepatic coma, Hyperlipidemia, and Hypertension. and presents with     Plan:  1. Shortness of breath: severe pulmonary htn, continue oxygen, patient had PFT in [place]  2. CARDIOMYOPAHTY: LHC/ RHC pefromed , patient has normal coronaries, NORMAL LVEF ON LCH,NORMAL LVEF,PCWP, has severe pulmonary HTN AND HYPOXEMIA, pulmonary consult appreciated, continue Oxygen, patient has not seen pulmonary since discharge from hospital, he has 24 hr home oxygen, will recommend to make appointment with pulmonay  3. HTN; stable  4. Dyslipidemia: stable  5.  Hep c: stable

## 2019-09-04 ENCOUNTER — OFFICE VISIT (OUTPATIENT)
Dept: CARDIOLOGY CLINIC | Age: 64
End: 2019-09-04
Payer: COMMERCIAL

## 2019-09-04 VITALS
SYSTOLIC BLOOD PRESSURE: 104 MMHG | HEIGHT: 64 IN | DIASTOLIC BLOOD PRESSURE: 70 MMHG | HEART RATE: 68 BPM | WEIGHT: 102.4 LBS | BODY MASS INDEX: 17.48 KG/M2

## 2019-09-04 DIAGNOSIS — I10 HYPERTENSION, UNSPECIFIED TYPE: Primary | ICD-10-CM

## 2019-09-04 DIAGNOSIS — I27.20 PULMONARY HTN (HCC): ICD-10-CM

## 2019-09-04 DIAGNOSIS — R53.83 FATIGUE, UNSPECIFIED TYPE: ICD-10-CM

## 2019-09-04 DIAGNOSIS — R03.0 ELEVATED BLOOD PRESSURE READING WITHOUT DIAGNOSIS OF HYPERTENSION: ICD-10-CM

## 2019-09-04 PROCEDURE — 1123F ACP DISCUSS/DSCN MKR DOCD: CPT | Performed by: NURSE PRACTITIONER

## 2019-09-04 PROCEDURE — G8419 CALC BMI OUT NRM PARAM NOF/U: HCPCS | Performed by: NURSE PRACTITIONER

## 2019-09-04 PROCEDURE — 93784 AMBL BP MNTR W/SOFTWARE: CPT | Performed by: NURSE PRACTITIONER

## 2019-09-04 PROCEDURE — G8427 DOCREV CUR MEDS BY ELIG CLIN: HCPCS | Performed by: NURSE PRACTITIONER

## 2019-09-04 PROCEDURE — 4040F PNEUMOC VAC/ADMIN/RCVD: CPT | Performed by: NURSE PRACTITIONER

## 2019-09-04 PROCEDURE — 3017F COLORECTAL CA SCREEN DOC REV: CPT | Performed by: NURSE PRACTITIONER

## 2019-09-04 PROCEDURE — 99213 OFFICE O/P EST LOW 20 MIN: CPT | Performed by: NURSE PRACTITIONER

## 2019-09-04 PROCEDURE — 4004F PT TOBACCO SCREEN RCVD TLK: CPT | Performed by: NURSE PRACTITIONER

## 2019-09-04 NOTE — PROGRESS NOTES
visit. Allergies: Morphine  Past Medical History:   Diagnosis Date    Hepatitis C without hepatic coma     Hyperlipidemia     Hypertension      Past Surgical History:   Procedure Laterality Date    CARDIAC CATHETERIZATION  10/25/2018     Family History   Problem Relation Age of Onset    Hypertension Mother     Diabetes Sister     Hypertension Brother      Social History     Tobacco Use    Smoking status: Current Some Day Smoker     Types: Cigarettes    Smokeless tobacco: Never Used    Tobacco comment: smokes 1 cig once in a blue moon   Substance Use Topics    Alcohol use: No        Review of Systems:   · Constitutional: No Fever, + unintentional weight Loss   · Eyes: No change in Vision:     · ENT: No Headaches, Hearing Loss or Vertigo. No tinnitus   · Cardiovascular: as per note above   · Respiratory: No cough or wheezing and as per note above. · Gastrointestinal: no abdominal pain, + appetite loss, no blood in stools, constipation, or diarrhea, No heartburn  · Genitourinary: No dysuria, trouble voiding, or hematuria  · Musculoskeletal: back pain- No: myalgia No,  Arthralgia- Yes  · Integumentary: No rash or pruritis  · Neurological: No TIA or stroke symptoms  · Psychiatric: Anxiety- No: depression-  No   · Endocrine: No malaise, Yes fatigue - no temperature intolerance  · Hematologic/Lymphatic: No bleeding problems, blood clots or swollen lymph nodes  · Allergic/Immunologic: No nasal congestion or hives    Objective:      Physical Exam:  /70   Pulse 68   Ht 5' 4\" (1.626 m)   Wt 102 lb 6.4 oz (46.4 kg)   BMI 17.58 kg/m²   Wt Readings from Last 3 Encounters:   09/04/19 102 lb 6.4 oz (46.4 kg)   05/17/19 104 lb 9.6 oz (47.4 kg)   11/15/18 109 lb 3.2 oz (49.5 kg)     Body mass index is 17.58 kg/m². GENERAL - Alert, oriented, pleasant, in no apparent distress.     Head unremarkable  Eyes - pupils equal and reactive to light - bilateral conjunctiva are pink: sclera are white   ENT - external

## 2019-09-30 ENCOUNTER — INITIAL CONSULT (OUTPATIENT)
Dept: PULMONOLOGY | Age: 64
End: 2019-09-30
Payer: COMMERCIAL

## 2019-09-30 VITALS
OXYGEN SATURATION: 82 % | BODY MASS INDEX: 17.51 KG/M2 | WEIGHT: 102 LBS | HEART RATE: 78 BPM | SYSTOLIC BLOOD PRESSURE: 116 MMHG | DIASTOLIC BLOOD PRESSURE: 68 MMHG

## 2019-09-30 DIAGNOSIS — I27.20 PULMONARY HYPERTENSION (HCC): ICD-10-CM

## 2019-09-30 DIAGNOSIS — F17.200 SMOKER: Primary | ICD-10-CM

## 2019-09-30 DIAGNOSIS — J44.9 CHRONIC OBSTRUCTIVE PULMONARY DISEASE, UNSPECIFIED COPD TYPE (HCC): ICD-10-CM

## 2019-09-30 DIAGNOSIS — R06.02 SOB (SHORTNESS OF BREATH) ON EXERTION: ICD-10-CM

## 2019-09-30 DIAGNOSIS — R09.02 HYPOXIA: ICD-10-CM

## 2019-09-30 DIAGNOSIS — G47.33 OSA (OBSTRUCTIVE SLEEP APNEA): ICD-10-CM

## 2019-09-30 PROCEDURE — G8427 DOCREV CUR MEDS BY ELIG CLIN: HCPCS | Performed by: INTERNAL MEDICINE

## 2019-09-30 PROCEDURE — G8419 CALC BMI OUT NRM PARAM NOF/U: HCPCS | Performed by: INTERNAL MEDICINE

## 2019-09-30 PROCEDURE — 3023F SPIROM DOC REV: CPT | Performed by: INTERNAL MEDICINE

## 2019-09-30 PROCEDURE — G8926 SPIRO NO PERF OR DOC: HCPCS | Performed by: INTERNAL MEDICINE

## 2019-09-30 PROCEDURE — 99204 OFFICE O/P NEW MOD 45 MIN: CPT | Performed by: INTERNAL MEDICINE

## 2019-09-30 RX ORDER — ALBUTEROL SULFATE 90 UG/1
2 AEROSOL, METERED RESPIRATORY (INHALATION) EVERY 6 HOURS PRN
Qty: 1 INHALER | Refills: 3 | Status: SHIPPED | OUTPATIENT
Start: 2019-09-30

## 2019-09-30 ASSESSMENT — SLEEP AND FATIGUE QUESTIONNAIRES
HOW LIKELY ARE YOU TO NOD OFF OR FALL ASLEEP WHILE SITTING AND TALKING TO SOMEONE: 0
NECK CIRCUMFERENCE (INCHES): 13
HOW LIKELY ARE YOU TO NOD OFF OR FALL ASLEEP IN A CAR, WHILE STOPPED FOR A FEW MINUTES IN TRAFFIC: 0
HOW LIKELY ARE YOU TO NOD OFF OR FALL ASLEEP WHILE SITTING AND READING: 0
HOW LIKELY ARE YOU TO NOD OFF OR FALL ASLEEP WHEN YOU ARE A PASSENGER IN A CAR FOR AN HOUR WITHOUT A BREAK: 1
HOW LIKELY ARE YOU TO NOD OFF OR FALL ASLEEP WHILE SITTING INACTIVE IN A PUBLIC PLACE: 0
HOW LIKELY ARE YOU TO NOD OFF OR FALL ASLEEP WHILE SITTING QUIETLY AFTER LUNCH WITHOUT ALCOHOL: 2
ESS TOTAL SCORE: 8
HOW LIKELY ARE YOU TO NOD OFF OR FALL ASLEEP WHILE LYING DOWN TO REST IN THE AFTERNOON WHEN CIRCUMSTANCES PERMIT: 3
HOW LIKELY ARE YOU TO NOD OFF OR FALL ASLEEP WHILE WATCHING TV: 2

## 2019-09-30 ASSESSMENT — ENCOUNTER SYMPTOMS
ABDOMINAL DISTENTION: 0
EYE DISCHARGE: 0
EYE ITCHING: 0
SHORTNESS OF BREATH: 1
ABDOMINAL PAIN: 0
COUGH: 0
BACK PAIN: 0

## 2019-09-30 NOTE — ASSESSMENT & PLAN NOTE
Advised to quit smoking  C/w inhalers  C/w Albuterol  PFT  6 MWT  Get yearly flu vaccine  Low dose Ct chest

## 2019-09-30 NOTE — PROGRESS NOTES
Manda Meckel  1955  Referring Provider: Dr. Tonya Austin    Subjective:     Chief Complaint   Patient presents with    Shortness of Breath     patient is having a hard time with Shortness of breath with oxygen        HPI  Claritza Garcia is a 75 y.o. male has come back as a follow up. He has a 50 pk yr smoking which he quit about 10 months ago. He had a ECHO done 08/29/18 which showed that he has grade II diastolic dysfunction. He has moderate pulmonary HTN. He has no cough, no phlegm, no hemoptysis, no loss of weight recently, good appetite, SOBOE. He is on oxygen 2 L/min. He is  On Albuterol  PRN. He has no PFT or the Low dose CT chest.    He doesn;t snore or stop breathing. He wakes up 1 to 2 times in the night to go to the bathroom. He never woke up choking or gasping for air, woke up with dry mouth, no palpitations. He goes to bed at 11 am and he gets up at 6 am. He is tired  On waking in the morning. He has no morning headaches in the morning. He is less tired during the day time.      He has no h/o PE or DVT    He has no liver disease, no HIV    He was not on medication to loose weight    He has no arthiritis    He has no MDS, Sarcoidosis    Current Outpatient Medications   Medication Sig Dispense Refill    albuterol sulfate HFA (VENTOLIN HFA) 108 (90 Base) MCG/ACT inhaler Inhale 2 puffs into the lungs every 6 hours as needed for Wheezing 1 Inhaler 3    furosemide (LASIX) 40 MG tablet Take 1 tablet by mouth daily 30 tablet 0    cloNIDine (CATAPRES) 0.2 MG tablet Take 0.2 mg by mouth every 8 hours      lisinopril (PRINIVIL;ZESTRIL) 40 MG tablet Take 40 mg by mouth daily      metoprolol tartrate (LOPRESSOR) 50 MG tablet Take 50 mg by mouth 2 times daily      aspirin 81 MG EC tablet Take 81 mg by mouth daily      potassium chloride (KLOR-CON M) 20 MEQ extended release tablet Take 1 tablet by mouth daily (Patient not taking: Reported on 9/30/2019) 30 tablet 0     No current facility-administered medications for this visit. Allergies   Allergen Reactions    Morphine        Past Medical History:   Diagnosis Date    Hepatitis C without hepatic coma     Hyperlipidemia     Hypertension        Past Surgical History:   Procedure Laterality Date    CARDIAC CATHETERIZATION  10/25/2018       Social History     Socioeconomic History    Marital status:      Spouse name: None    Number of children: None    Years of education: None    Highest education level: None   Occupational History    None   Social Needs    Financial resource strain: None    Food insecurity:     Worry: None     Inability: None    Transportation needs:     Medical: None     Non-medical: None   Tobacco Use    Smoking status: Current Some Day Smoker     Types: Cigarettes    Smokeless tobacco: Never Used    Tobacco comment: smokes 1 cig once in a blue moon   Substance and Sexual Activity    Alcohol use: No    Drug use: Yes     Types: Marijuana    Sexual activity: Never   Lifestyle    Physical activity:     Days per week: None     Minutes per session: None    Stress: None   Relationships    Social connections:     Talks on phone: None     Gets together: None     Attends Taoist service: None     Active member of club or organization: None     Attends meetings of clubs or organizations: None     Relationship status: None    Intimate partner violence:     Fear of current or ex partner: None     Emotionally abused: None     Physically abused: None     Forced sexual activity: None   Other Topics Concern    None   Social History Narrative    None       Review of Systems   Constitutional: Positive for fatigue. HENT: Negative for congestion and nosebleeds. Eyes: Negative for discharge and itching. Respiratory: Positive for shortness of breath. Negative for cough. Cardiovascular: Negative for chest pain and leg swelling. Gastrointestinal: Negative for abdominal distention and abdominal pain.    Endocrine: Negative for cold

## 2019-10-22 ENCOUNTER — HOSPITAL ENCOUNTER (OUTPATIENT)
Dept: SLEEP CENTER | Age: 64
Discharge: HOME | End: 2019-10-22
Payer: COMMERCIAL

## 2019-10-22 DIAGNOSIS — G47.33 OSA (OBSTRUCTIVE SLEEP APNEA): ICD-10-CM

## 2019-10-22 PROCEDURE — 95810 POLYSOM 6/> YRS 4/> PARAM: CPT

## 2019-10-22 ASSESSMENT — SLEEP AND FATIGUE QUESTIONNAIRES
HOW LIKELY ARE YOU TO NOD OFF OR FALL ASLEEP WHILE LYING DOWN TO REST IN THE AFTERNOON WHEN CIRCUMSTANCES PERMIT: 3
HOW LIKELY ARE YOU TO NOD OFF OR FALL ASLEEP IN A CAR, WHILE STOPPED FOR A FEW MINUTES IN TRAFFIC: 0
HOW LIKELY ARE YOU TO NOD OFF OR FALL ASLEEP WHILE SITTING QUIETLY AFTER LUNCH WITHOUT ALCOHOL: 2
NECK CIRCUMFERENCE (INCHES): 13
HOW LIKELY ARE YOU TO NOD OFF OR FALL ASLEEP WHEN YOU ARE A PASSENGER IN A CAR FOR AN HOUR WITHOUT A BREAK: 1
ESS TOTAL SCORE: 8
HOW LIKELY ARE YOU TO NOD OFF OR FALL ASLEEP WHILE WATCHING TV: 2
HOW LIKELY ARE YOU TO NOD OFF OR FALL ASLEEP WHILE SITTING INACTIVE IN A PUBLIC PLACE: 0
HOW LIKELY ARE YOU TO NOD OFF OR FALL ASLEEP WHILE SITTING AND TALKING TO SOMEONE: 0
HOW LIKELY ARE YOU TO NOD OFF OR FALL ASLEEP WHILE SITTING AND READING: 0

## 2019-10-23 LAB — STATUS: NORMAL

## 2019-10-23 PROCEDURE — 95810 POLYSOM 6/> YRS 4/> PARAM: CPT | Performed by: INTERNAL MEDICINE

## 2019-11-20 ENCOUNTER — HOSPITAL ENCOUNTER (OUTPATIENT)
Age: 64
Discharge: HOME | End: 2019-11-20
Payer: COMMERCIAL

## 2019-11-20 ENCOUNTER — HOSPITAL ENCOUNTER (OUTPATIENT)
Dept: GENERAL RADIOLOGY | Age: 64
Discharge: HOME | End: 2019-11-20
Payer: COMMERCIAL

## 2019-11-20 ENCOUNTER — HOSPITAL ENCOUNTER (OUTPATIENT)
Dept: NUCLEAR MEDICINE | Age: 64
Discharge: HOME | End: 2019-11-20
Payer: COMMERCIAL

## 2019-11-20 ENCOUNTER — HOSPITAL ENCOUNTER (OUTPATIENT)
Dept: PULMONOLOGY | Age: 64
Discharge: HOME | End: 2019-11-20
Payer: COMMERCIAL

## 2019-11-20 VITALS
DIASTOLIC BLOOD PRESSURE: 85 MMHG | OXYGEN SATURATION: 87 % | HEART RATE: 84 BPM | RESPIRATION RATE: 16 BRPM | SYSTOLIC BLOOD PRESSURE: 134 MMHG

## 2019-11-20 DIAGNOSIS — R06.02 SOB (SHORTNESS OF BREATH): ICD-10-CM

## 2019-11-20 DIAGNOSIS — R06.02 SOB (SHORTNESS OF BREATH) ON EXERTION: ICD-10-CM

## 2019-11-20 DIAGNOSIS — I27.20 PULMONARY HYPERTENSION (HCC): ICD-10-CM

## 2019-11-20 DIAGNOSIS — J44.9 CHRONIC OBSTRUCTIVE PULMONARY DISEASE, UNSPECIFIED COPD TYPE (HCC): ICD-10-CM

## 2019-11-20 PROCEDURE — A9539 TC99M PENTETATE: HCPCS | Performed by: INTERNAL MEDICINE

## 2019-11-20 PROCEDURE — A9540 TC99M MAA: HCPCS | Performed by: INTERNAL MEDICINE

## 2019-11-20 PROCEDURE — 71046 X-RAY EXAM CHEST 2 VIEWS: CPT

## 2019-11-20 PROCEDURE — 78582 LUNG VENTILAT&PERFUS IMAGING: CPT

## 2019-11-20 PROCEDURE — 3430000000 HC RX DIAGNOSTIC RADIOPHARMACEUTICAL: Performed by: INTERNAL MEDICINE

## 2019-11-20 RX ORDER — KIT FOR THE PREPARATION OF TECHNETIUM TC 99M PENTETATE 20 MG/1
3 INJECTION, POWDER, LYOPHILIZED, FOR SOLUTION INTRAVENOUS; RESPIRATORY (INHALATION)
Status: COMPLETED | OUTPATIENT
Start: 2019-11-20 | End: 2019-11-20

## 2019-11-20 RX ADMIN — KIT FOR THE PREPARATION OF TECHNETIUM TC 99M PENTETATE 3 MILLICURIE: 20 INJECTION, POWDER, LYOPHILIZED, FOR SOLUTION INTRAVENOUS; RESPIRATORY (INHALATION) at 13:40

## 2019-11-20 RX ADMIN — Medication 5 MILLICURIE: at 13:55

## 2019-11-21 ENCOUNTER — OFFICE VISIT (OUTPATIENT)
Dept: CARDIOLOGY CLINIC | Age: 64
End: 2019-11-21
Payer: COMMERCIAL

## 2019-11-21 VITALS
DIASTOLIC BLOOD PRESSURE: 70 MMHG | SYSTOLIC BLOOD PRESSURE: 110 MMHG | BODY MASS INDEX: 17.42 KG/M2 | HEIGHT: 64 IN | WEIGHT: 102 LBS | HEART RATE: 80 BPM

## 2019-11-21 DIAGNOSIS — I27.20 PULMONARY HYPERTENSION (HCC): ICD-10-CM

## 2019-11-21 DIAGNOSIS — G47.33 OSA (OBSTRUCTIVE SLEEP APNEA): ICD-10-CM

## 2019-11-21 DIAGNOSIS — I50.40 CHF (CONGESTIVE HEART FAILURE), NYHA CLASS III, UNSPECIFIED FAILURE CHRONICITY, COMBINED (HCC): Primary | ICD-10-CM

## 2019-11-21 PROCEDURE — G8427 DOCREV CUR MEDS BY ELIG CLIN: HCPCS | Performed by: NURSE PRACTITIONER

## 2019-11-21 PROCEDURE — 99213 OFFICE O/P EST LOW 20 MIN: CPT | Performed by: NURSE PRACTITIONER

## 2019-11-21 PROCEDURE — 1123F ACP DISCUSS/DSCN MKR DOCD: CPT | Performed by: NURSE PRACTITIONER

## 2019-11-21 PROCEDURE — 3017F COLORECTAL CA SCREEN DOC REV: CPT | Performed by: NURSE PRACTITIONER

## 2019-11-21 PROCEDURE — 4040F PNEUMOC VAC/ADMIN/RCVD: CPT | Performed by: NURSE PRACTITIONER

## 2019-11-21 PROCEDURE — 4004F PT TOBACCO SCREEN RCVD TLK: CPT | Performed by: NURSE PRACTITIONER

## 2019-11-21 PROCEDURE — G8484 FLU IMMUNIZE NO ADMIN: HCPCS | Performed by: NURSE PRACTITIONER

## 2019-11-21 PROCEDURE — G8419 CALC BMI OUT NRM PARAM NOF/U: HCPCS | Performed by: NURSE PRACTITIONER

## 2019-12-03 ENCOUNTER — OFFICE VISIT (OUTPATIENT)
Dept: PULMONOLOGY | Age: 64
End: 2019-12-03
Payer: COMMERCIAL

## 2019-12-03 VITALS
WEIGHT: 102 LBS | HEIGHT: 65 IN | BODY MASS INDEX: 17 KG/M2 | DIASTOLIC BLOOD PRESSURE: 64 MMHG | SYSTOLIC BLOOD PRESSURE: 98 MMHG | OXYGEN SATURATION: 86 % | HEART RATE: 53 BPM

## 2019-12-03 DIAGNOSIS — R06.02 SOB (SHORTNESS OF BREATH) ON EXERTION: ICD-10-CM

## 2019-12-03 DIAGNOSIS — R09.02 HYPOXIA: ICD-10-CM

## 2019-12-03 DIAGNOSIS — F17.210 CIGARETTE SMOKER: Primary | ICD-10-CM

## 2019-12-03 DIAGNOSIS — J44.9 CHRONIC OBSTRUCTIVE PULMONARY DISEASE, UNSPECIFIED COPD TYPE (HCC): ICD-10-CM

## 2019-12-03 DIAGNOSIS — I27.20 PULMONARY HYPERTENSION (HCC): ICD-10-CM

## 2019-12-03 DIAGNOSIS — G47.33 OSA (OBSTRUCTIVE SLEEP APNEA): ICD-10-CM

## 2019-12-03 PROCEDURE — G8419 CALC BMI OUT NRM PARAM NOF/U: HCPCS | Performed by: INTERNAL MEDICINE

## 2019-12-03 PROCEDURE — G8926 SPIRO NO PERF OR DOC: HCPCS | Performed by: INTERNAL MEDICINE

## 2019-12-03 PROCEDURE — 99213 OFFICE O/P EST LOW 20 MIN: CPT | Performed by: INTERNAL MEDICINE

## 2019-12-03 PROCEDURE — G8427 DOCREV CUR MEDS BY ELIG CLIN: HCPCS | Performed by: INTERNAL MEDICINE

## 2019-12-03 PROCEDURE — 3023F SPIROM DOC REV: CPT | Performed by: INTERNAL MEDICINE

## 2019-12-03 PROCEDURE — G8484 FLU IMMUNIZE NO ADMIN: HCPCS | Performed by: INTERNAL MEDICINE

## 2019-12-03 RX ORDER — BUDESONIDE AND FORMOTEROL FUMARATE DIHYDRATE 160; 4.5 UG/1; UG/1
2 AEROSOL RESPIRATORY (INHALATION) 2 TIMES DAILY
Qty: 1 INHALER | Refills: 3 | Status: SHIPPED | OUTPATIENT
Start: 2019-12-03 | End: 2020-02-11

## 2019-12-03 ASSESSMENT — ENCOUNTER SYMPTOMS
EYE ITCHING: 0
SHORTNESS OF BREATH: 1
EYE DISCHARGE: 0
ABDOMINAL PAIN: 0
COUGH: 1
BACK PAIN: 0
ABDOMINAL DISTENTION: 0

## 2019-12-04 ENCOUNTER — TELEPHONE (OUTPATIENT)
Dept: PULMONOLOGY | Age: 64
End: 2019-12-04

## 2020-02-11 RX ORDER — BUDESONIDE AND FORMOTEROL FUMARATE DIHYDRATE 160; 4.5 UG/1; UG/1
AEROSOL RESPIRATORY (INHALATION)
Qty: 30.6 INHALER | Refills: 1 | Status: SHIPPED | OUTPATIENT
Start: 2020-02-11 | End: 2021-01-29 | Stop reason: SDUPTHER

## 2020-04-06 ENCOUNTER — TELEPHONE (OUTPATIENT)
Dept: PULMONOLOGY | Age: 65
End: 2020-04-06

## 2020-04-06 RX ORDER — UMECLIDINIUM 62.5 UG/1
AEROSOL, POWDER ORAL
Qty: 1 EACH | Refills: 3 | Status: SHIPPED | OUTPATIENT
Start: 2020-04-06 | End: 2020-07-21 | Stop reason: SDUPTHER

## 2020-05-18 ENCOUNTER — TELEPHONE (OUTPATIENT)
Dept: CARDIOLOGY CLINIC | Age: 65
End: 2020-05-18

## 2020-07-21 RX ORDER — UMECLIDINIUM 62.5 UG/1
AEROSOL, POWDER ORAL
Qty: 1 EACH | Refills: 4 | Status: SHIPPED | OUTPATIENT
Start: 2020-07-21 | End: 2020-12-17

## 2020-08-28 RX ORDER — BUDESONIDE AND FORMOTEROL FUMARATE DIHYDRATE 160; 4.5 UG/1; UG/1
AEROSOL RESPIRATORY (INHALATION)
Qty: 30.6 INHALER | Refills: 1 | OUTPATIENT
Start: 2020-08-28

## 2020-10-02 ENCOUNTER — TELEPHONE (OUTPATIENT)
Dept: CARDIOLOGY CLINIC | Age: 65
End: 2020-10-02

## 2020-12-17 RX ORDER — UMECLIDINIUM 62.5 UG/1
AEROSOL, POWDER ORAL
Qty: 1 EACH | Refills: 4 | Status: SHIPPED | OUTPATIENT
Start: 2020-12-17 | End: 2021-05-06

## 2021-01-29 ENCOUNTER — VIRTUAL VISIT (OUTPATIENT)
Dept: PULMONOLOGY | Age: 66
End: 2021-01-29
Payer: COMMERCIAL

## 2021-01-29 DIAGNOSIS — R06.02 SOB (SHORTNESS OF BREATH) ON EXERTION: ICD-10-CM

## 2021-01-29 DIAGNOSIS — G47.34 SLEEP RELATED HYPOXIA: ICD-10-CM

## 2021-01-29 DIAGNOSIS — J44.9 CHRONIC OBSTRUCTIVE PULMONARY DISEASE, UNSPECIFIED COPD TYPE (HCC): ICD-10-CM

## 2021-01-29 DIAGNOSIS — F17.210 CIGARETTE SMOKER: ICD-10-CM

## 2021-01-29 DIAGNOSIS — Z01.818 PREOP TESTING: Primary | ICD-10-CM

## 2021-01-29 PROCEDURE — 99443 PR PHYS/QHP TELEPHONE EVALUATION 21-30 MIN: CPT | Performed by: INTERNAL MEDICINE

## 2021-01-29 RX ORDER — BUDESONIDE AND FORMOTEROL FUMARATE DIHYDRATE 160; 4.5 UG/1; UG/1
AEROSOL RESPIRATORY (INHALATION)
Qty: 30.6 INHALER | Refills: 1 | Status: SHIPPED | OUTPATIENT
Start: 2021-01-29 | End: 2021-07-22

## 2021-01-29 NOTE — PROGRESS NOTES
Soundra Patch  1955  Referring Provider: Dr. Maggie Alcocer:     Chief Complaint   Patient presents with    Follow-up    COPD    Shortness of Breath    Sleep Apnea       HPI  Eliud Aceves is a 77 y.o. male is doing a telephone follow up visit. He has a 50 pk yr smoking which he quit 1 year ago. He is on 2 L.min of oxygen. He has no BIJU on the PSG done on 10/22/19. He is on 2 L/min of oxygen at night for sleep related hypoxia. His last EF is 35%. He has no cough, no phlegm, occasional SOB. He had no low dose CT chest, PFT or the 6 MWT    Current Outpatient Medications   Medication Sig Dispense Refill    budesonide-formoterol (SYMBICORT) 160-4.5 MCG/ACT AERO TAKE 2 PUFFS BY MOUTH TWICE A DAY 30.6 Inhaler 1    INCRUSE ELLIPTA 62.5 MCG/INH AEPB INHALE ONE PUFF BY MOUTH EVERY DAY 1 each 4    tiotropium (SPIRIVA RESPIMAT) 2.5 MCG/ACT AERS inhaler Inhale 2 puffs into the lungs daily 1 Inhaler 3    albuterol sulfate HFA (VENTOLIN HFA) 108 (90 Base) MCG/ACT inhaler Inhale 2 puffs into the lungs every 6 hours as needed for Wheezing 1 Inhaler 3    furosemide (LASIX) 40 MG tablet Take 1 tablet by mouth daily 30 tablet 0    potassium chloride (KLOR-CON M) 20 MEQ extended release tablet Take 1 tablet by mouth daily 30 tablet 0    cloNIDine (CATAPRES) 0.2 MG tablet Take 0.2 mg by mouth every 8 hours      lisinopril (PRINIVIL;ZESTRIL) 40 MG tablet Take 40 mg by mouth daily      metoprolol tartrate (LOPRESSOR) 50 MG tablet Take 50 mg by mouth 2 times daily      aspirin 81 MG EC tablet Take 81 mg by mouth daily       No current facility-administered medications for this visit.         Allergies   Allergen Reactions    Morphine        Past Medical History:   Diagnosis Date    Hepatitis C without hepatic coma     Hyperlipidemia     Hypertension        Past Surgical History:   Procedure Laterality Date    CARDIAC CATHETERIZATION  10/25/2018       Social History     Socioeconomic History  Marital status:      Spouse name: None    Number of children: None    Years of education: None    Highest education level: None   Occupational History    None   Social Needs    Financial resource strain: None    Food insecurity     Worry: None     Inability: None    Transportation needs     Medical: None     Non-medical: None   Tobacco Use    Smoking status: Current Some Day Smoker     Types: Cigarettes    Smokeless tobacco: Never Used    Tobacco comment: smokes 1 cig once in a blue moon   Substance and Sexual Activity    Alcohol use: No    Drug use: Yes     Types: Marijuana    Sexual activity: Never   Lifestyle    Physical activity     Days per week: None     Minutes per session: None    Stress: None   Relationships    Social connections     Talks on phone: None     Gets together: None     Attends Zoroastrian service: None     Active member of club or organization: None     Attends meetings of clubs or organizations: None     Relationship status: None    Intimate partner violence     Fear of current or ex partner: None     Emotionally abused: None     Physically abused: None     Forced sexual activity: None   Other Topics Concern    None   Social History Narrative    None       Review of Systems    Objective: There were no vitals taken for this visit. There is no height or weight on file to calculate BMI.   Sleep Medicine 10/22/2019 9/30/2019   Sitting and reading 0 0   Watching TV 2 2   Sitting, inactive in a public place (e.g. a theatre or a meeting) 0 0   As a passenger in a car for an hour without a break 1 1   Lying down to rest in the afternoon when circumstances permit 3 3   Sitting and talking to someone 0 0   Sitting quietly after a lunch without alcohol 2 2   In a car, while stopped for a few minutes in traffic 0 0   Total score 8 8   Neck circumference 13 13             Radiology: None    Assessment and Plan     Problem List        Pulmonary Problems Chronic obstructive pulmonary disease (HCC)     Advised to quit smoking  PFT  6 MWT  Low dose CT chest         Relevant Medications    albuterol sulfate HFA (VENTOLIN HFA) 108 (90 Base) MCG/ACT inhaler    tiotropium (SPIRIVA RESPIMAT) 2.5 MCG/ACT AERS inhaler    INCRUSE ELLIPTA 62.5 MCG/INH AEPB    budesonide-formoterol (SYMBICORT) 160-4.5 MCG/ACT AERO    SOB (shortness of breath) on exertion     PFT  6 MWT  CHF optimization         Relevant Orders    Full PFT Study With Bronchodilator    6 Minute Walk Test    Sleep related hypoxia     Advised to use 2 L.min of oxygen in the night            Other    Cigarette smoker     Advised to quit smoking         Relevant Orders    CT LUNG SCREENING    Full PFT Study With Bronchodilator    6 Minute Walk Test               Return in about 4 weeks (around 2/26/2021) for PFT, 6 MWT, Low dose CT chest.     Progress notes sent to the referring Provider    Miky Gonzales MD  1/29/2021  11:31 AM  Priscilla Pagan is a 77 y.o. male being evaluated by a Virtual Visit (video visit) encounter to address concerns as mentioned above. A caregiver was present when appropriate. Due to this being a TeleHealth encounter (During JJZSB-61 public health emergency), evaluation of the following organ systems was limited: Vitals/Constitutional/EENT/Resp/CV/GI//MS/Neuro/Skin/Heme-Lymph-Imm. Pursuant to the emergency declaration under the [facility], [address] [place] [address] authority and the [employer] and [employer] Act, this Virtual Visit was conducted with patient's (and/or legal guardian's) consent, to reduce the patient's risk of exposure to COVID-19 and provide necessary medical care. The patient (and/or legal guardian) has also been advised to contact this office for worsening conditions or problems, and seek emergency medical treatment and/or call 911 if deemed necessary. Patient identification was verified at the start of the visit: Yes    Total time spent for this encounter: 21 minutes    Services were provided through a video synchronous discussion virtually to substitute for in-person clinic visit. Patient and provider were located at their individual homes. --Nadeem Moya MD on 1/29/2021 at 11:31 AM    An electronic signature was used to authenticate this note.

## 2021-03-15 ENCOUNTER — TELEPHONE (OUTPATIENT)
Dept: PULMONOLOGY | Age: 66
End: 2021-03-15

## 2021-03-15 NOTE — TELEPHONE ENCOUNTER
Patient called asking  For central scheduling number he needed to rescheduled his covid19 test patient said it is to cold for him to go out.

## 2021-04-20 ENCOUNTER — HOSPITAL ENCOUNTER (OUTPATIENT)
Dept: LAB | Age: 66
Discharge: HOME | End: 2021-04-20
Payer: COMMERCIAL

## 2021-04-20 PROCEDURE — 36415 COLL VENOUS BLD VENIPUNCTURE: CPT

## 2021-04-20 PROCEDURE — U0005 INFEC AGEN DETEC AMPLI PROBE: HCPCS

## 2021-04-20 PROCEDURE — U0003 INFECTIOUS AGENT DETECTION BY NUCLEIC ACID (DNA OR RNA); SEVERE ACUTE RESPIRATORY SYNDROME CORONAVIRUS 2 (SARS-COV-2) (CORONAVIRUS DISEASE [COVID-19]), AMPLIFIED PROBE TECHNIQUE, MAKING USE OF HIGH THROUGHPUT TECHNOLOGIES AS DESCRIBED BY CMS-2020-01-R: HCPCS

## 2021-04-21 LAB
SARS-COV-2: NOT DETECTED
SOURCE: NORMAL

## 2021-04-22 DIAGNOSIS — Z01.811 PREOP PULMONARY/RESPIRATORY EXAM: Primary | ICD-10-CM

## 2021-05-06 DIAGNOSIS — J44.9 CHRONIC OBSTRUCTIVE PULMONARY DISEASE, UNSPECIFIED COPD TYPE (HCC): ICD-10-CM

## 2021-05-06 RX ORDER — UMECLIDINIUM 62.5 UG/1
AEROSOL, POWDER ORAL
Qty: 1 EACH | Refills: 4 | Status: SHIPPED | OUTPATIENT
Start: 2021-05-06 | End: 2021-09-27

## 2021-05-12 NOTE — PROGRESS NOTES
Called pt. Pt has not had a recent Covid 19 test. I canceled tomorrow's appointment. Will notify Central Scheduling.

## 2021-05-13 ENCOUNTER — HOSPITAL ENCOUNTER (OUTPATIENT)
Dept: PULMONOLOGY | Age: 66
Discharge: HOME | End: 2021-05-13
Payer: COMMERCIAL

## 2021-06-11 ENCOUNTER — TELEPHONE (OUTPATIENT)
Dept: CASE MANAGEMENT | Age: 66
End: 2021-06-11

## 2021-06-11 NOTE — TELEPHONE ENCOUNTER
This RN called patient to offer assistance to get his lung cancer screening rescheduled. Patient agreeable to take number for central scheduling and stated he will call to reschedule appointment. This RNs phone number also given to patient and patient informed he can call me if he needs assistance. Patient voices understanding.

## 2021-07-22 RX ORDER — BUDESONIDE AND FORMOTEROL FUMARATE DIHYDRATE 160; 4.5 UG/1; UG/1
AEROSOL RESPIRATORY (INHALATION)
Qty: 1 INHALER | Refills: 3 | Status: SHIPPED | OUTPATIENT
Start: 2021-07-22

## 2021-09-27 DIAGNOSIS — J44.9 CHRONIC OBSTRUCTIVE PULMONARY DISEASE, UNSPECIFIED COPD TYPE (HCC): ICD-10-CM

## 2021-09-27 RX ORDER — UMECLIDINIUM 62.5 UG/1
AEROSOL, POWDER ORAL
Qty: 30 EACH | Refills: 4 | Status: SHIPPED | OUTPATIENT
Start: 2021-09-27

## 2021-12-01 ENCOUNTER — APPOINTMENT (OUTPATIENT)
Dept: CT IMAGING | Age: 66
DRG: 175 | End: 2021-12-01
Payer: OTHER GOVERNMENT

## 2021-12-01 ENCOUNTER — APPOINTMENT (OUTPATIENT)
Dept: GENERAL RADIOLOGY | Age: 66
DRG: 175 | End: 2021-12-01
Payer: OTHER GOVERNMENT

## 2021-12-01 ENCOUNTER — HOSPITAL ENCOUNTER (INPATIENT)
Age: 66
LOS: 2 days | Discharge: ANOTHER HOSPITAL | DRG: 175 | End: 2021-12-03
Attending: INTERNAL MEDICINE
Payer: OTHER GOVERNMENT

## 2021-12-01 DIAGNOSIS — I26.93 SINGLE SUBSEGMENTAL PULMONARY EMBOLISM WITHOUT ACUTE COR PULMONALE (HCC): Primary | ICD-10-CM

## 2021-12-01 DIAGNOSIS — I50.9 CONGESTIVE HEART FAILURE, UNSPECIFIED HF CHRONICITY, UNSPECIFIED HEART FAILURE TYPE (HCC): ICD-10-CM

## 2021-12-01 DIAGNOSIS — R06.00 DYSPNEA, UNSPECIFIED TYPE: ICD-10-CM

## 2021-12-01 PROBLEM — I26.99 ACUTE PULMONARY EMBOLISM (HCC): Status: ACTIVE | Noted: 2021-12-01

## 2021-12-01 LAB
ALBUMIN SERPL-MCNC: 2.5 GM/DL (ref 3.4–5)
ALP BLD-CCNC: 132 IU/L (ref 40–129)
ALT SERPL-CCNC: 116 U/L (ref 10–40)
ANION GAP SERPL CALCULATED.3IONS-SCNC: 7 MMOL/L (ref 4–16)
APTT: 23.9 SECONDS (ref 25.1–37.1)
AST SERPL-CCNC: 82 IU/L (ref 15–37)
BACTERIA: ABNORMAL /HPF
BASE EXCESS MIXED: 9.2 (ref 0–1.2)
BASOPHILS ABSOLUTE: 0 K/CU MM
BASOPHILS RELATIVE PERCENT: 0.3 % (ref 0–1)
BILIRUB SERPL-MCNC: 0.2 MG/DL (ref 0–1)
BILIRUBIN URINE: NEGATIVE MG/DL
BLOOD, URINE: NEGATIVE
BUN BLDV-MCNC: 26 MG/DL (ref 6–23)
CALCIUM SERPL-MCNC: 8 MG/DL (ref 8.3–10.6)
CHLORIDE BLD-SCNC: 108 MMOL/L (ref 99–110)
CLARITY: CLEAR
CO2: 33 MMOL/L (ref 21–32)
COLOR: YELLOW
COMMENT: ABNORMAL
CREAT SERPL-MCNC: 0.6 MG/DL (ref 0.9–1.3)
DIFFERENTIAL TYPE: ABNORMAL
EOSINOPHILS ABSOLUTE: 0 K/CU MM
EOSINOPHILS RELATIVE PERCENT: 0.6 % (ref 0–3)
GFR AFRICAN AMERICAN: >60 ML/MIN/1.73M2
GFR NON-AFRICAN AMERICAN: >60 ML/MIN/1.73M2
GLUCOSE BLD-MCNC: 119 MG/DL (ref 70–99)
GLUCOSE, URINE: NEGATIVE MG/DL
HCO3 VENOUS: 37.5 MMOL/L (ref 19–25)
HCT VFR BLD CALC: 39.4 % (ref 42–52)
HCT VFR BLD CALC: 41.1 % (ref 42–52)
HEMOGLOBIN: 11.9 GM/DL (ref 13.5–18)
HEMOGLOBIN: 12.5 GM/DL (ref 13.5–18)
HYALINE CASTS: 2 /LPF
IMMATURE NEUTROPHIL %: 0.3 % (ref 0–0.43)
KETONES, URINE: NEGATIVE MG/DL
LACTATE: 2.2 MMOL/L (ref 0.4–2)
LACTATE: 2.7 MMOL/L (ref 0.4–2)
LEUKOCYTE ESTERASE, URINE: NEGATIVE
LYMPHOCYTES ABSOLUTE: 0.8 K/CU MM
LYMPHOCYTES RELATIVE PERCENT: 23.1 % (ref 24–44)
MAGNESIUM: 1.7 MG/DL (ref 1.8–2.4)
MCH RBC QN AUTO: 31 PG (ref 27–31)
MCH RBC QN AUTO: 31.2 PG (ref 27–31)
MCHC RBC AUTO-ENTMCNC: 30.2 % (ref 32–36)
MCHC RBC AUTO-ENTMCNC: 30.4 % (ref 32–36)
MCV RBC AUTO: 102.5 FL (ref 78–100)
MCV RBC AUTO: 102.6 FL (ref 78–100)
MONOCYTES ABSOLUTE: 0.3 K/CU MM
MONOCYTES RELATIVE PERCENT: 7.6 % (ref 0–4)
MUCUS: ABNORMAL HPF
NITRITE URINE, QUANTITATIVE: NEGATIVE
NUCLEATED RBC %: 0 %
O2 SAT, VEN: 93.4 % (ref 50–70)
PCO2, VEN: 68 MMHG (ref 38–52)
PDW BLD-RTO: 12 % (ref 11.7–14.9)
PDW BLD-RTO: 12.2 % (ref 11.7–14.9)
PH VENOUS: 7.35 (ref 7.32–7.42)
PH, URINE: 5 (ref 5–8)
PLATELET # BLD: 116 K/CU MM (ref 140–440)
PLATELET # BLD: 123 K/CU MM (ref 140–440)
PMV BLD AUTO: 10.9 FL (ref 7.5–11.1)
PMV BLD AUTO: 11.1 FL (ref 7.5–11.1)
PO2, VEN: 153 MMHG (ref 28–48)
POTASSIUM SERPL-SCNC: 4.4 MMOL/L (ref 3.5–5.1)
PRO-BNP: 3568 PG/ML
PROCALCITONIN: 0.07
PROTEIN UA: NEGATIVE MG/DL
RBC # BLD: 3.84 M/CU MM (ref 4.6–6.2)
RBC # BLD: 4.01 M/CU MM (ref 4.6–6.2)
RBC URINE: <1 /HPF (ref 0–3)
SARS-COV-2, NAAT: NOT DETECTED
SEGMENTED NEUTROPHILS ABSOLUTE COUNT: 2.3 K/CU MM
SEGMENTED NEUTROPHILS RELATIVE PERCENT: 68.1 % (ref 36–66)
SODIUM BLD-SCNC: 148 MMOL/L (ref 135–145)
SOURCE: NORMAL
SPECIFIC GRAVITY UA: 1.01 (ref 1–1.03)
SPERM: ABNORMAL /HFP
SQUAMOUS EPITHELIAL: <1 /HPF
TOTAL IMMATURE NEUTOROPHIL: 0.01 K/CU MM
TOTAL NUCLEATED RBC: 0 K/CU MM
TOTAL PROTEIN: 5 GM/DL (ref 6.4–8.2)
TRICHOMONAS: ABNORMAL /HPF
TROPONIN T: 0.07 NG/ML
TSH HIGH SENSITIVITY: 1.54 UIU/ML (ref 0.27–4.2)
UROBILINOGEN, URINE: NEGATIVE MG/DL (ref 0.2–1)
WBC # BLD: 3.4 K/CU MM (ref 4–10.5)
WBC # BLD: 3.6 K/CU MM (ref 4–10.5)
WBC UA: ABNORMAL /HPF (ref 0–2)

## 2021-12-01 PROCEDURE — 99222 1ST HOSP IP/OBS MODERATE 55: CPT | Performed by: INTERNAL MEDICINE

## 2021-12-01 PROCEDURE — 2000000000 HC ICU R&B

## 2021-12-01 PROCEDURE — 87635 SARS-COV-2 COVID-19 AMP PRB: CPT

## 2021-12-01 PROCEDURE — 96365 THER/PROPH/DIAG IV INF INIT: CPT

## 2021-12-01 PROCEDURE — 6360000002 HC RX W HCPCS: Performed by: PHYSICIAN ASSISTANT

## 2021-12-01 PROCEDURE — 85027 COMPLETE CBC AUTOMATED: CPT

## 2021-12-01 PROCEDURE — 6360000004 HC RX CONTRAST MEDICATION: Performed by: PHYSICIAN ASSISTANT

## 2021-12-01 PROCEDURE — 84443 ASSAY THYROID STIM HORMONE: CPT

## 2021-12-01 PROCEDURE — 85025 COMPLETE CBC W/AUTO DIFF WBC: CPT

## 2021-12-01 PROCEDURE — 71045 X-RAY EXAM CHEST 1 VIEW: CPT

## 2021-12-01 PROCEDURE — 6370000000 HC RX 637 (ALT 250 FOR IP): Performed by: PHYSICIAN ASSISTANT

## 2021-12-01 PROCEDURE — 94640 AIRWAY INHALATION TREATMENT: CPT

## 2021-12-01 PROCEDURE — 80053 COMPREHEN METABOLIC PANEL: CPT

## 2021-12-01 PROCEDURE — 83605 ASSAY OF LACTIC ACID: CPT

## 2021-12-01 PROCEDURE — 84145 PROCALCITONIN (PCT): CPT

## 2021-12-01 PROCEDURE — 99284 EMERGENCY DEPT VISIT MOD MDM: CPT

## 2021-12-01 PROCEDURE — 83735 ASSAY OF MAGNESIUM: CPT

## 2021-12-01 PROCEDURE — 84484 ASSAY OF TROPONIN QUANT: CPT

## 2021-12-01 PROCEDURE — 96366 THER/PROPH/DIAG IV INF ADDON: CPT

## 2021-12-01 PROCEDURE — 83880 ASSAY OF NATRIURETIC PEPTIDE: CPT

## 2021-12-01 PROCEDURE — 36415 COLL VENOUS BLD VENIPUNCTURE: CPT

## 2021-12-01 PROCEDURE — 81001 URINALYSIS AUTO W/SCOPE: CPT

## 2021-12-01 PROCEDURE — 85730 THROMBOPLASTIN TIME PARTIAL: CPT

## 2021-12-01 PROCEDURE — 96375 TX/PRO/DX INJ NEW DRUG ADDON: CPT

## 2021-12-01 PROCEDURE — 71275 CT ANGIOGRAPHY CHEST: CPT

## 2021-12-01 PROCEDURE — 82805 BLOOD GASES W/O2 SATURATION: CPT

## 2021-12-01 PROCEDURE — 94664 DEMO&/EVAL PT USE INHALER: CPT

## 2021-12-01 RX ORDER — HEPARIN SODIUM 10000 [USP'U]/100ML
5-30 INJECTION, SOLUTION INTRAVENOUS CONTINUOUS
Status: DISCONTINUED | OUTPATIENT
Start: 2021-12-01 | End: 2021-12-02

## 2021-12-01 RX ORDER — HEPARIN SODIUM 1000 [USP'U]/ML
80 INJECTION, SOLUTION INTRAVENOUS; SUBCUTANEOUS PRN
Status: DISCONTINUED | OUTPATIENT
Start: 2021-12-01 | End: 2021-12-02

## 2021-12-01 RX ORDER — METHYLPREDNISOLONE SODIUM SUCCINATE 125 MG/2ML
125 INJECTION, POWDER, LYOPHILIZED, FOR SOLUTION INTRAMUSCULAR; INTRAVENOUS ONCE
Status: COMPLETED | OUTPATIENT
Start: 2021-12-01 | End: 2021-12-01

## 2021-12-01 RX ORDER — FUROSEMIDE 10 MG/ML
40 INJECTION INTRAMUSCULAR; INTRAVENOUS ONCE
Status: COMPLETED | OUTPATIENT
Start: 2021-12-01 | End: 2021-12-01

## 2021-12-01 RX ORDER — ALBUTEROL SULFATE 90 UG/1
2 AEROSOL, METERED RESPIRATORY (INHALATION) ONCE
Status: COMPLETED | OUTPATIENT
Start: 2021-12-01 | End: 2021-12-01

## 2021-12-01 RX ORDER — HEPARIN SODIUM 1000 [USP'U]/ML
80 INJECTION, SOLUTION INTRAVENOUS; SUBCUTANEOUS ONCE
Status: COMPLETED | OUTPATIENT
Start: 2021-12-01 | End: 2021-12-01

## 2021-12-01 RX ORDER — HEPARIN SODIUM 1000 [USP'U]/ML
40 INJECTION, SOLUTION INTRAVENOUS; SUBCUTANEOUS PRN
Status: DISCONTINUED | OUTPATIENT
Start: 2021-12-01 | End: 2021-12-02

## 2021-12-01 RX ORDER — MAGNESIUM SULFATE IN WATER 40 MG/ML
2000 INJECTION, SOLUTION INTRAVENOUS ONCE
Status: COMPLETED | OUTPATIENT
Start: 2021-12-01 | End: 2021-12-01

## 2021-12-01 RX ADMIN — IOPAMIDOL 75 ML: 755 INJECTION, SOLUTION INTRAVENOUS at 14:46

## 2021-12-01 RX ADMIN — ALBUTEROL SULFATE 2 PUFF: 90 AEROSOL, METERED RESPIRATORY (INHALATION) at 11:44

## 2021-12-01 RX ADMIN — MAGNESIUM SULFATE HEPTAHYDRATE 2000 MG: 40 INJECTION, SOLUTION INTRAVENOUS at 12:21

## 2021-12-01 RX ADMIN — Medication 2 PUFF: at 11:45

## 2021-12-01 RX ADMIN — FUROSEMIDE 40 MG: 10 INJECTION INTRAMUSCULAR; INTRAVENOUS at 12:01

## 2021-12-01 RX ADMIN — HEPARIN SODIUM AND DEXTROSE 44.12 UNITS/KG/HR: 10000; 5 INJECTION INTRAVENOUS at 21:04

## 2021-12-01 RX ADMIN — HEPARIN SODIUM 3260 UNITS: 1000 INJECTION INTRAVENOUS; SUBCUTANEOUS at 21:03

## 2021-12-01 RX ADMIN — METHYLPREDNISOLONE SODIUM SUCCINATE 125 MG: 125 INJECTION, POWDER, LYOPHILIZED, FOR SOLUTION INTRAMUSCULAR; INTRAVENOUS at 12:04

## 2021-12-01 NOTE — ED NOTES
FTF report given to Gregorio Blank for end of shift coverage and assumption of care.        Teresita Oviedo RN  12/01/21 1115

## 2021-12-01 NOTE — ED NOTES
1513 paged hospitalist     Pankaj Conroy  12/01/21 [id number] with [facility] returned call     Pankaj Conroy  12/01/21 5737

## 2021-12-01 NOTE — ED NOTES
Bed: ED-31  Expected date:   Expected time:   Means of arrival:   Comments:  [id number], RN  12/01/21 [id number]

## 2021-12-01 NOTE — ED PROVIDER NOTES
EMERGENCY DEPARTMENT ENCOUNTER      PCP: [id number]    Chief Complaint   Patient presents with    Fatigue     getting worse past 4 days    Shortness of Breath     with exertion. at 1.5 Lpm at home at 86%, now on 6Lpm in 90's         Of note, this patient was not evaluated by attending physician, attending physician was available for consultation. HPI    Colt Robert is a 74 y.o. male who presents to the emergency department today via EMS with generalized fatigue, weakness, progressive shortness of breath. Has developed over the last week. States that he gets short of breath with minimal exertion. He states he had a mild cough. Usually wears oxygen, has history of COPD CHF. Does have lower leg swelling bilaterally. Denies fevers or chills. Has had decreased oral intake but does state he is eating and drinking, having bowel movements, urinating appropriately. Patient does live with son, has had episodes of bowel incontinence/diarrhea as well. REVIEW OF SYSTEMS    Constitutional: See HPI  HENT:  Denies sore throat or ear pain   Cardiovascular:  No chest pain. No palpitations, No syncope  Respiratory:  See HPI. GI:  Denies abdominal pain, nausea, vomiting, or diarrhea  :  Denies any urinary symptoms .    Musculoskeletal:  Denies back pain,   Skin:  Denies rash  Neurologic:  Denies headache, focal weakness or sensory changes   Endocrine:  Denies polyuria or polydypsia   Lymphatic:  Denies swollen glands     All other review of systems are negative  See HPI and nursing notes for additional information       PAST MEDICAL & SURGICAL HISTORY    Past Medical History:   Diagnosis Date    Hepatitis C without hepatic coma     Hyperlipidemia     Hypertension      Past Surgical History:   Procedure Laterality Date    CARDIAC CATHETERIZATION  10/25/2018       CURRENT MEDICATIONS    Current Outpatient Rx   Medication Sig Dispense Refill    INCRUSE ELLIPTA 62.5 MCG/INH AEPB INHALE ONE PUFF BY MOUTH EVERY DAY 30 each 4    budesonide-formoterol (SYMBICORT) 160-4.5 MCG/ACT AERO TAKE 2 PUFFS BY MOUTH TWICE A DAY 1 Inhaler 3    tiotropium (SPIRIVA RESPIMAT) 2.5 MCG/ACT AERS inhaler Inhale 2 puffs into the lungs daily 1 Inhaler 3    albuterol sulfate HFA (VENTOLIN HFA) 108 (90 Base) MCG/ACT inhaler Inhale 2 puffs into the lungs every 6 hours as needed for Wheezing 1 Inhaler 3    furosemide (LASIX) 40 MG tablet Take 1 tablet by mouth daily 30 tablet 0    potassium chloride (KLOR-CON M) 20 MEQ extended release tablet Take 1 tablet by mouth daily 30 tablet 0    cloNIDine (CATAPRES) 0.2 MG tablet Take 0.2 mg by mouth every 8 hours      lisinopril (PRINIVIL;ZESTRIL) 40 MG tablet Take 40 mg by mouth daily      metoprolol tartrate (LOPRESSOR) 50 MG tablet Take 50 mg by mouth 2 times daily      aspirin 81 MG EC tablet Take 81 mg by mouth daily         ALLERGIES    Allergies   Allergen Reactions    Morphine        SOCIAL & FAMILY HISTORY    Social History     Socioeconomic History    Marital status:      Spouse name: None    Number of children: None    Years of education: None    Highest education level: None   Occupational History    None   Tobacco Use    Smoking status: Current Some Day Smoker     Types: Cigarettes    Smokeless tobacco: Never Used    Tobacco comment: smokes 1 cig once in a blue moon   Substance and Sexual Activity    Alcohol use: No    Drug use: Yes     Types: Marijuana Ryder Blow)    Sexual activity: Never   Other Topics Concern    None   Social History Narrative    None     Social Determinants of Health     Financial Resource Strain:     Difficulty of Paying Living Expenses: Not on file   Food Insecurity:     Worried About Running Out of Food in the Last Year: Not on file    Anna of Food in the Last Year: Not on file   Transportation Needs:     Lack of Transportation (Medical): Not on file    Lack of Transportation (Non-Medical):  Not on file Physical Activity:     Days of Exercise per Week: Not on file    Minutes of Exercise per Session: Not on file   Stress:     Feeling of Stress : Not on file   Social Connections:     Frequency of Communication with Friends and Family: Not on file    Frequency of Social Gatherings with Friends and Family: Not on file    Attends Yazidi Services: Not on file    Active Member of [address] [place] [address] or Organizations: Not on file    Attends Club or Organization Meetings: Not on file    Marital Status: Not on file   Intimate Partner Violence:     Fear of Current or Ex-Partner: Not on file    Emotionally Abused: Not on file    Physically Abused: Not on file    Sexually Abused: Not on file   Housing Stability:     Unable to Pay for Housing in the Last Year: Not on file    Number of [place] in the Last Year: Not on file    Unstable Housing in the Last Year: Not on file     Family History   Problem Relation Age of Onset    Hypertension Mother     Diabetes Sister     Hypertension Brother        PHYSICAL EXAM    VITAL SIGNS: BP 94/80   Pulse 107   Temp 98.1 °F (36.7 °C) (Oral)   Resp 18   Ht 5' 5\" (1.651 m)   Wt 90 lb (40.8 kg)   SpO2 100%   BMI 14.98 kg/m²    Constitutional:  Well developed, well nourished, no acute distress   HENT:  Atraumatic, moist mucus membranes  Neck/Lymphatics: supple, no JVD, no swollen nodes  Respiratory: Mildly increased labored breathing, respirations are 18. Lung sounds diminished, no obvious rhonchi rales wheezing. Cardiovascular:  Rate tachycardic, normal Rhythm,  no murmurs/rubs/gallops. No carotid bruits or murmurs heard in carotids. No JVD  GI:  Soft, nontender, normal bowel sounds  Musculoskeletal:    There is no edema, asymmetry, or calf / thigh tenderness bilaterally. No cyanosis. No cool or pale-appearing limb.   Distal cap refill and pulses intact bilateral upper and lower extremities  Bilateral upper and lower extremity ROM intact without pain or obvious deficit  Integument:  Skin is warm and dry, no petechiae   Neurologic:  Alert & oriented, no slurred speech  Psych: Pleasant affect, no hallucinations      EKG    See supervising physician note for EKG interpretation.     LABS:  Results for orders placed or performed during the hospital encounter of 12/01/21   COVID-19, Rapid    Specimen: Nasopharyngeal   Result Value Ref Range    Source THROAT     SARS-CoV-2, NAAT NOT DETECTED NOT DETECTED   CBC auto diff   Result Value Ref Range    WBC 3.4 (L) 4.0 - 10.5 K/CU MM    RBC 3.84 (L) 4.6 - 6.2 M/CU MM    Hemoglobin 11.9 (L) 13.5 - 18.0 GM/DL    Hematocrit 39.4 (L) 42 - 52 %    .6 (H) 78 - 100 FL    MCH 31.0 27 - 31 PG    MCHC 30.2 (L) 32.0 - 36.0 %    RDW 12.2 11.7 - 14.9 %    Platelets 351 (L) 215 - 440 K/CU MM    MPV 11.1 7.5 - 11.1 FL    Differential Type AUTOMATED DIFFERENTIAL     Segs Relative 68.1 (H) 36 - 66 %    Lymphocytes % 23.1 (L) 24 - 44 %    Monocytes % 7.6 (H) 0 - 4 %    Eosinophils % 0.6 0 - 3 %    Basophils % 0.3 0 - 1 %    Segs Absolute 2.3 K/CU MM    Lymphocytes Absolute 0.8 K/CU MM    Monocytes Absolute 0.3 K/CU MM    Eosinophils Absolute 0.0 K/CU MM    Basophils Absolute 0.0 K/CU MM    Nucleated RBC % 0.0 %    Total Nucleated RBC 0.0 K/CU MM    Total Immature Neutrophil 0.01 K/CU MM    Immature Neutrophil % 0.3 0 - 0.43 %   CMP   Result Value Ref Range    Sodium 148 (H) 135 - 145 MMOL/L    Potassium 4.4 3.5 - 5.1 MMOL/L    Chloride 108 99 - 110 mMol/L    CO2 33 (H) 21 - 32 MMOL/L    BUN 26 (H) 6 - 23 MG/DL    CREATININE 0.6 (L) 0.9 - 1.3 MG/DL    Glucose 119 (H) 70 - 99 MG/DL    Calcium 8.0 (L) 8.3 - 10.6 MG/DL    Albumin 2.5 (L) 3.4 - 5.0 GM/DL    Total Protein 5.0 (L) 6.4 - 8.2 GM/DL    Total Bilirubin 0.2 0.0 - 1.0 MG/DL     (H) 10 - 40 U/L    AST 82 (H) 15 - 37 IU/L    Alkaline Phosphatase 132 (H) 40 - 129 IU/L    GFR Non-African American >60 >60 mL/min/1.73m2    GFR African American >60 >60 mL/min/1.73m2    Anion Gap 7 4 - 16 Troponin   Result Value Ref Range    Troponin T 0.065 (H) <0.01 NG/ML   Brain Natriuretic Peptide   Result Value Ref Range    Pro-BNP 3,568 (H) <300 PG/ML   Magnesium   Result Value Ref Range    Magnesium 1.7 (L) 1.8 - 2.4 mg/dl   TSH without Reflex   Result Value Ref Range    TSH, High Sensitivity 1.540 0.270 - 4.20 uIu/ml   Lactic Acid, Plasma   Result Value Ref Range    Lactate 2.7 (HH) 0.4 - 2.0 mMOL/L   Blood Gas, Venous   Result Value Ref Range    pH, Joselito 7.35 7.32 - 7.42    pCO2, Joselito 68 (H) 38 - 52 mmHG    pO2, Joselito 153 (H) 28 - 48 mmHG    Base Exc, Mixed 9.2 (H) 0 - 1.2    HCO3, Venous 37.5 (H) 19 - 25 MMOL/L    O2 Sat, Joselito 93.4 (H) 50 - 70 %    Comment VBG    Procalcitonin   Result Value Ref Range    Procalcitonin 0.066    Urinalysis (Lab)   Result Value Ref Range    Color, UA YELLOW YELLOW    Clarity, UA CLEAR CLEAR    Glucose, Urine NEGATIVE NEGATIVE MG/DL    Bilirubin Urine NEGATIVE NEGATIVE MG/DL    Ketones, Urine NEGATIVE NEGATIVE MG/DL    Specific Gravity, UA 1.014 1.001 - 1.035    Blood, Urine NEGATIVE NEGATIVE    pH, Urine 5.0 5.0 - 8.0    Protein, UA NEGATIVE NEGATIVE MG/DL    Urobilinogen, Urine NEGATIVE 0.2 - 1.0 MG/DL    Nitrite Urine, Quantitative NEGATIVE NEGATIVE    Leukocyte Esterase, Urine NEGATIVE NEGATIVE    RBC, UA <1 0 - 3 /HPF    WBC, UA NONE SEEN 0 - 2 /HPF    Bacteria, UA RARE (A) NEGATIVE /HPF    Squam Epithel, UA <1 /HPF    Mucus, UA RARE (A) NEGATIVE HPF    Sperm, UA RARE /HFP    Trichomonas, UA NONE SEEN NONE SEEN /HPF    Hyaline Casts, UA 2 /LPF   Lactic Acid, Plasma   Result Value Ref Range    Lactate 2.2 (HH) 0.4 - 2.0 mMOL/L   CBC   Result Value Ref Range    WBC 3.6 (L) 4.0 - 10.5 K/CU MM    RBC 4.01 (L) 4.6 - 6.2 M/CU MM    Hemoglobin 12.5 (L) 13.5 - 18.0 GM/DL    Hematocrit 41.1 (L) 42 - 52 %    .5 (H) 78 - 100 FL    MCH 31.2 (H) 27 - 31 PG    MCHC 30.4 (L) 32.0 - 36.0 %    RDW 12.0 11.7 - 14.9 %    Platelets 291 (L) 294 - 440 K/CU MM    MPV 10.9 7.5 - 11.1 FL APTT   Result Value Ref Range    aPTT 23.9 (L) 25.1 - 37.1 SECONDS       RADIOLOGY/PROCEDURES    XR CHEST PORTABLE    Result Date: 12/1/2021  EXAMINATION: ONE XRAY VIEW OF THE CHEST 12/1/2021 10:58 am COMPARISON: 11/20/2019 HISTORY: ORDERING SYSTEM PROVIDED HISTORY: SOB TECHNOLOGIST PROVIDED HISTORY: Reason for exam:->SOB Reason for Exam: SOB Acuity: Acute Type of Exam: Initial FINDINGS: The trachea is midline. The cardiac silhouette is unremarkable. There is hyperinflation of the lung fields. There is linear atelectasis or scarring in the right mid lung field which is unchanged. No obvious acute superimposed process. Chronic changes in the right lung base. Stable chest.  Scarring or atelectasis in the right mid lung field. No acute pulmonary process. CTA PULMONARY W CONTRAST    Result Date: 12/1/2021  EXAMINATION: CTA OF THE CHEST 12/1/2021 11:19 am TECHNIQUE: CTA of the chest was performed after the administration of intravenous contrast.  Multiplanar reformatted images are provided for review. MIP images are provided for review. Dose modulation, iterative reconstruction, and/or weight based adjustment of the mA/kV was utilized to reduce the radiation dose to as low as reasonably achievable. COMPARISON: 08/29/2018 HISTORY: ORDERING SYSTEM PROVIDED HISTORY: tachycardic, hypoxia, ro pe or acute cardiopulmonary process TECHNOLOGIST PROVIDED HISTORY: Reason for exam:->tachycardic, hypoxia, ro pe or acute cardiopulmonary process Decision Support Exception - unselect if not a suspected or confirmed emergency medical condition->Emergency Medical Condition (MA) Reason for Exam: Shortness of Breath FINDINGS: Pulmonary Arteries: The pulmonary arteries are adequately opacified. There is a filling defect identified within the right middle lobe pulmonary artery (series 5, image 168). No definite additional filling defects are identified.   The right ventricle is enlarged relative to the left, but this is a chronic finding, unchanged when compared to the previous exam from 2018, and likely relates to chronic pulmonary hypertension rather than to acute right heart strain. Mediastinum: Visualized thyroid unremarkable. No mediastinal or hilar lymphadenopathy. Esophagus unremarkable. Thoracic aorta is normal in caliber. No evidence of dissection. Lungs/pleura: There is redemonstration of a band of atelectasis/scarring along the minor fissure, not substantially changed when compared to the 2018 exam.  No acute focal infiltrate is identified. Severe emphysematous changes are present. Upper Abdomen: The ascites is noted within the upper abdomen. Limited imaging of the upper abdomen otherwise unremarkable. Soft Tissues/Bones: The patient is cachectic. No acute bony abnormalities are identified. Positive for pulmonary embolism, with a single small embolus found in the right middle lobe. The right ventricle is larger than the left, but that is a chronic finding, and is almost certainly secondary to chronic pulmonary hypertension. Right heart strain is considered very unlikely, especially given the very limited clot burden. Severe emphysema. Small volume ascites in the upper abdomen. ED COURSE & MEDICAL DECISION MAKING      Patient presents as above. Emergent etiologies considered. Patient seen and examined. Patient is having progressive shortness of breath, generalized weakness. Was hypoxic on his regular home oxygen, was raised up to 6 L, he is in the mid 90s, has mildly increased rate of breathing. Denying chest pain, does show signs of peripheral edema. Has history of severe COPD CHF. Is not anticoagulated. Is not vaccinated. Denies history of Covid exposures. Broad work-up initiated.     Work-up demonstrating pulmonary embolus in a single small small embolus of the right middle lobe, right ventricular is larger than left which is a chronic finding most likely secondary to patient's pulmonary hypertension. He does have elevation of his troponin and BNP in the setting of acute on chronic sleep/decompensated heart failure. Patient's magnesium is slightly low. Patient was given Lasix, magnesium, will be initiated on a heparin drip. Will consult cardiology. Will admit to the hospitalist team.    CRITICAL CARE NOTE:  There was a high probability of clinically significant life-threatening deterioration of the patient's condition requiring my urgent intervention due to dyspnea on exertion, CHF, acute pulmonary emboli IV steroids, IV magnesium, IV Lasix, IV heparin, patient counseling, specialty consultation was performed to address this. Total critical care time is  30 minutes. This includes vital sign monitoring, pulse oximetry monitoring, telemetry monitoring, clinical response to the IV medications, reviewing the nursing notes, consultation time, dictation/documentation time, and interpretation of the lab work. This time excludes time spent performing procedures and separately billable procedures and family discussion time. Vital signs and nursing notes reviewed during ED course. All pertinent Lab data and radiographic results reviewed with patient at bedside. The patient and/or the family were informed of the results of any tests/labs/imaging, the treatment plan, and time was allotted to answer questions. Clinical  IMPRESSION    1. Single subsegmental pulmonary embolism without acute cor pulmonale (HCC)    2. Dyspnea, unspecified type    3. Congestive heart failure, unspecified HF chronicity, unspecified heart failure type ([facility] [address]      Comment: Please note this report has been produced using speech recognition software and may contain errors related to that system including errors in grammar, punctuation, and spelling, as well as words and phrases that may be inappropriate. If there are any questions or concerns please feel free to contact the dictating provider for clarification. Coleen Cantor 411, PA  12/01/21 1900

## 2021-12-01 NOTE — CONSULTS
INPATIENT CARDIOLOGY CONSULT NOTE       Reason for consultation:  PE    Primary care physician: Loida aPtel           Chief Complaint   Patient presents with    Fatigue     getting worse past 4 days    Shortness of Breath     with exertion. at 1.5 Lpm at home at 86%, now on 6Lpm in 90's       History of present illness:Lizandro is a 74 y. o.year old who  presents with  Chief Complaint   Patient presents with    Fatigue     getting worse past 4 days    Shortness of Breath     with exertion. at 1.5 Lpm at home at 86%, now on 6Lpm in 90's       Patient is a 22-year-old malnourished -American male with BMI of 15 presents to the hospital with chief complaint of shortness of breath. Patient was noted to have hypoxia and tachycardia on arrival.  Patient underwent CT scan which showed acute PE  Cardiology consulted for evaluation  Patient denies any prior history of CAD CHF arrhythmias or thromboembolic disease  On review of medical records, patient has prior history of cardiomyopathy right ventricular hypertrophy pulmonary hypertension liver cirrhosis and hepatitis C. Patient in the past has seen Dr. Ruddy Kline as outpatient but failed to follow-up since 2019    He also underwent left and right heart heart cath in 2018 which showed normal coronaries with pulmonary hypertension    Last echocardiogram in 2018 showed EF of 35% with severe pulmonary hypertension/RVSP      CT of the chest shows PE small embolus found in the right middle lobe. Severe emphysema noted otherwise    Past medical history:    has a past medical history of Hepatitis C without hepatic coma, Hyperlipidemia, and Hypertension. Past surgical history:   has a past surgical history that includes Cardiac catheterization (10/25/2018). Social History:   reports that he has been smoking cigarettes. He has never used smokeless tobacco. He reports current drug use. Drug: Marijuana Deanna Elva). He reports that he does not drink alcohol.   Family Decreased Vision  · ENT: No Headaches, Hearing Loss or Vertigo  · Cardiovascular: No chest pain,  +++ dyspnea on exertion, palpitations or loss of consciousness  · Respiratory: No cough or wheezing    · Gastrointestinal: No abdominal pain, appetite loss, blood in stools, constipation, diarrhea or heartburn  · Genitourinary: No dysuria, trouble voiding, or hematuria  · Musculoskeletal:  No gait disturbance, weakness or joint complaints  · Integumentary: No rash or pruritis  · Neurological: No TIA or stroke symptoms  · Psychiatric: No anxiety or depression  · Endocrine: No malaise, fatigue or temperature intolerance  · Hematologic/Lymphatic: No bleeding problems, blood clots or swollen lymph nodes  · Allergic/Immunologic: No nasal congestion or hives    All other systems were reviewed and were negative otherwise. Physical Examination:      Vitals:    12/01/21 1322   BP: 94/80   Pulse: 107   Resp:    Temp:    SpO2: 100%      Wt Readings from Last 3 Encounters:   12/01/21 90 lb (40.8 kg)   12/03/19 102 lb (46.3 kg)   11/21/19 102 lb (46.3 kg)     Body mass index is 14.98 kg/m². General Appearance:  No distress, conversant  Constitutional:  Well developed, Well nourished  HEENT:  Normocephalic, Atraumatic, Oropharynx moist, No oral exudates,   Nose normal. Neck Supple Carotid: no carotid bruit  Eyes:  Conjunctiva normal, No discharge. Respiratory:    Normal breath sounds, No respiratory distress, No wheezing, no use of accessory muscles, diaphragm movement is normal  No chest Tenderness  Cardiovascular: S1-S2 No murmurs auscultated. No rubs, thrills or gallops. Normal  rhythm. Pedal pulses are normal. No pedal edema  GI:  Soft Non tender, non distended. :  No CVA tenderness. Musculoskeletal:   No tenderness, No cyanosis, No clubbing. Integument:  Warm, Dry, No erythema, No rash. Lymphatic:  No lymphadenopathy noted. Neurologic:  Alert & oriented x 3  No focal deficits noted.    Psychiatric: Affect normal, Judgment normal, Mood normal.       Lab Review     Recent Labs     12/01/21  1046   WBC 3.4*   HGB 11.9*   HCT 39.4*   *      Recent Labs     12/01/21  1046   *   K 4.4      CO2 33*   BUN 26*   CREATININE 0.6*     Recent Labs     12/01/21  1046   AST 82*   *   BILITOT 0.2   ALKPHOS 132*     No results for input(s): TROPONINI in the last 72 hours. No results found for: BNP  Lab Results   Component Value Date    INR 1.03 10/22/2018    PROTIME 11.7 10/22/2018         All labs, images, EKGs were personally reviewed      Assessment: 74 y. o.year old with PMH of  has a past medical history of Hepatitis C without hepatic coma, Hyperlipidemia, and Hypertension. Recommendations:      1. Acute pulmonary embolism: CT scan reviewed. No saddle or central PE small PE noted in the right lung. Recommend IV heparin. We will switch over to oral anticoagulation in the morning. Obtain echocardiogram  2. History of severe pulmonary hypertension: S/p right heart cath in 2018. Patient unfortunately failed to follow-up. Will obtain echo. Diuretics as needed  3. History of cardiomyopathy with prior EF of 35%. Will initiate guideline directed medical therapy after echocardiogram  4. Severe protein energy malnutrition with BMI of 15  ??   Malignancy work-up as per primary team    Thank you for the consult    Dr. Raj Calvillo  12/1/2021 4:25 PM

## 2021-12-01 NOTE — CARE COORDINATION
CM consult per Papo BURNS to initiate d/c planning pt will be admitted today for medical needs, pt is incontinent, declining independence, inability to care for self.

## 2021-12-01 NOTE — ED NOTES
Open wound to sacrum. Covered at this time. Tear to right ankle and dressing applied at this time.       Franco Canseco RN  12/01/21 [id number]

## 2021-12-01 NOTE — ED NOTES
Pt incontinent of stool at this time. Complete linen and gown change. Urine collected and at bedside.       Roseline Alvarez RN  12/01/21 4152

## 2021-12-01 NOTE — ED NOTES
Bed: ED-38  Expected date:   Expected time:   Means of arrival:   Comments:  [facility]  12/01/21 5563

## 2021-12-01 NOTE — ED NOTES
Received report on this patient. We are attempting to get admit orders. He is placed in room 31 and back on 02 at 6L/min. He lives at home on 02 per nc on 2L/min. He called 911 for SOB and weakness. Lawernce Roughen almost fell today while walking. 02 sat was 84% on 2L/min when they arrived. The son is concerned that his father may need to move in with him. Or he needs to go to a nursing home.       Damari Nava RN  12/01/21 3090

## 2021-12-01 NOTE — H&P
History and Physical      Name:  Otto Mares. /Age/Sex: 1955  (75 y.o. male)   MRN & CSN:  [id number] & [id number] Admission Date/Time: 2021 10:08 AM   Location:  ED31/ED-31 PCP: Sebastian Starkey Day: 1           Assessment and Plan:   # Acute Pulmonary embolism, no apparent right heart strain on CT imaging -anticoagulated with heparin drip for now, obtain TTE, EKG, supplemental oxygenation  # Acute on chronic respiratory failure with hypoxia -in setting of above. Rapid COVID negative. Patient has baseline O2 requirement 2 L per nasal cannula,  currently requiring 4 L, wean as tolerated  # Elevated troponin - in setting of acute PE. No hx of CAD. On hep gtt as noted. F/u TTE, trend trops, continue asa, beta-blocker as BP allows, lipid panel in am. Cardiology consulted, will f/u recs  # Elevated lactic acid - in setting of respiratory failure. Lactic acid 2.2<--2.7 (adm). Procal .066. Pt afebrile, no infectious source noted at this time, will trend lactics. # Abnormal LFTs/Hx Hep C, cirrhosis per EHR - normal bili. Patient denies abdominal pain, will check acute hepatitis panel and follow-up, repeat LFTs in a.m., consider further imaging if no improvement  # Hypernatremia -due to free water deficit, monitor for now  # Hypomagnesemia - Replacement ordered  # Pancytopenia - Chronic hx of low platelets, macrocytosis noted. Will check folate, b12, iron studies, consider Heme consultation. Recommend colonoscopy eval, can be done as oupt  # Generalized weakness with chronic deconditioning  # Hypoalbuminemia - suspect protein silvia malnutrition, check pre-albumin, Nutrition consult  # Chronic CHF with systolic/diastolic dysfx -EF 73% on TTE 2018 with grade 2 diastolic dysfunction. Dosed with IV Lasix in ED.  Resume oral Lasix in am, resume beta-blocker and ACE inhibitor as BP allows, follow-up TTE, daily weights strict I/O, cardiac diet  # Essential HTN - Hold anti-hypertensives for now, given lower trending BP, monitor for resuming      Other chronic medical conditions- Resume home medications unless contraindicated  # COPD-not in exacerbation - Resume inhalers  # Chronic pulmonary hypertension - CT imaging noted, f/u TTE. Pt is followed by Pulmonology  # Hx BIJU - pt not on CPAP  # Hx tobacco abuse - pt reports no longer smokes          Present on Admission:   Acute pulmonary embolism (HCC)             Diet No diet orders on file   DVT Prophylaxis [] Lovenox, [x]  Heparin, [] SCDs, [] Ambulation  [] Long term AC   GI Prophylaxis [] PPI,  [x] H2 Blocker,  [] Carafate,  [] Diet/Tube Feeds   Code Status Full code    Disposition Admit to stepdown in pt. Patient plans to return home upon discharge         Chief Complaint: Fatigue (getting worse past 4 days) and Shortness of Breath (with exertion. at 1.5 Lpm at home at 86%, now on 6Lpm in 90's)      History obtained from patient and EHR  History of Present Illness:   Otto Mares. is a 74 y.o. male  with history of cardiomyopathy with EF 35%, essential hypertension, pulmonary hypertension, COPD, chronic hypoxic respiratory failure on home O2 at 2 L per nasal cannula, BIJU who presents with shortness of breath. The patient reports shortness of breath for the past month as well as lower extremity swelling. Reports shortness of breath at rest and worse with exertion. He denies associated chest pain, palpitations, nausea vomiting or diaphoresis. Patient reports shortness of breath despite O2 and breathing treatments at home. Per EHR his O2 sats were 86% on his home O2. Patient normally sleeps on 2 pillows. He denies PND. He denies cough fever chills or recent infection. Patient has general decreased oral intake and reports generalized fatigue. He denies any other acute issues. He was evaluated emergency department. He presented afebrile, pulse 108, blood pressure 97/77 with O2 sat 100% on 6 L per nasal cannula.   CT a of the chest showed pulmonary embolism with a single small embolus found in the right middle lobe, right ventricle larger than left at noted is chronic finding, right heart strain is considered very unlikely; other findings noted below. EKG not available for review. Troponin 0.065. BNP 3568. Venous blood gas showing normal PH, PCO 68, HCO3 37.5 PO2 153, O2 sat 93.4. Chemistry panel significant for sodium 148 CO2 33 BUN 26 creatinine 0.6, Kos 119. Magnesium 1.7. Lactic acid 2.7, repeat 2.2. LFTs showing albumin 2.5 alk phos 132  AST 82, total bili within normal limits. WBC 3.4, hemoglobin 11.9, hematocrit 39.4, platelets 733. UA negative for UTI. The patient was initiated on heparin gtt and admitted for further management. Cardiology has been consulted. Ten point ROS: reviewed and are negative, unless as noted in above HPI. Objective:   No intake or output data in the 24 hours ending 12/01/21 1548     Vitals:   Vitals:    12/01/21 1011 12/01/21 1144 12/01/21 1322   BP: 97/77  94/80   Pulse: 108  107   Resp: 20 18    Temp: 98.1 °F (36.7 °C)     TempSrc: Oral     SpO2: 100% 100% 100%   Weight: 90 lb (40.8 kg)     Height: 5' 5\" (1.651 m)         Physical Exam: 12/01/21     GEN -Awake alert, cachetic male in NAD. Appears given age. EYES -anicteric, conjunctiva pink. HENT -Head is normocephalic, atraumatic. MM are moist. Oral pharynx without exudates, no evidence of thrush. NECK -Supple, no apparent thyromegaly or masses. RESP -Decreased breath sounds, no wheezes, rales or rhonchi. Symmetric chest movement   C/V -S1/S2 auscultated. RRR without appreciable M/R/G. No JVD. Cap refill <3 sec. BLE edema. GI -Abdomen is soft non distended, non tender to palpation. + BS. No masses or guarding.  -No CVA/ flank tenderness. Myrick catheter is not present. LYMPH- No petechiae or ecchymoses. MS -No gross joint deformities. SKIN -Normal coloration, warm, dry.   NEURO-Cranial nerves appear grossly intact, normal speech, no lateralizing weakness. PSYC-Awake, alert, oriented x 4 . Appropriate affect. Past Medical History:      Past Medical History:   Diagnosis Date    Hepatitis C without hepatic coma     Hyperlipidemia     Hypertension        Past Surgical  History:    has a past surgical history that includes Cardiac catheterization (10/25/2018). Family  History:   family history includes Diabetes in his sister; Hypertension in his brother and mother. Social History:     Social History     Socioeconomic History    Marital status:      Spouse name: None    Number of children: None    Years of education: None    Highest education level: None   Occupational History    None   Tobacco Use    Smoking status: Current Some Day Smoker     Types: Cigarettes    Smokeless tobacco: Never Used    Tobacco comment: smokes 1 cig once in a blue moon   Substance and Sexual Activity    Alcohol use: No    Drug use: Yes     Types: Marijuana Bertie Coil)    Sexual activity: Never   Other Topics Concern    None   Social History Narrative    None     Social Determinants of Health     Financial Resource Strain:     Difficulty of Paying Living Expenses: Not on file   Food Insecurity:     Worried About Running Out of Food in the Last Year: Not on file    Anna of Food in the Last Year: Not on file   Transportation Needs:     Lack of Transportation (Medical): Not on file    Lack of Transportation (Non-Medical):  Not on file   Physical Activity:     Days of Exercise per Week: Not on file    Minutes of Exercise per Session: Not on file   Stress:     Feeling of Stress : Not on file   Social Connections:     Frequency of Communication with Friends and Family: Not on file    Frequency of Social Gatherings with Friends and Family: Not on file    Attends [employer] Services: Not on file    Active Member of Clubs or Organizations: Not on file    Attends Club or Organization Meetings: Not on file   Reji Almeida Units/kg IntraVENous Once      Infusions:    heparin (PORCINE) Infusion       PRN Meds: heparin (porcine), 80 Units/kg, PRN  heparin (porcine), 40 Units/kg, PRN        Data:     Laboratory this visit:  Reviewed  Recent Labs     12/01/21  1046   WBC 3.4*   HGB 11.9*   HCT 39.4*   *      Recent Labs     12/01/21  1046   *   K 4.4      CO2 33*   BUN 26*   CREATININE 0.6*     Recent Labs     12/01/21  1046   AST 82*   *   BILITOT 0.2   ALKPHOS 132*     No results for input(s): INR in the last 72 hours. Radiology this visit:  Reviewed. XR CHEST PORTABLE    Result Date: 12/1/2021  EXAMINATION: ONE XRAY VIEW OF THE CHEST 12/1/2021 10:58 am COMPARISON: 11/20/2019 HISTORY: ORDERING SYSTEM PROVIDED HISTORY: SOB TECHNOLOGIST PROVIDED HISTORY: Reason for exam:->SOB Reason for Exam: SOB Acuity: Acute Type of Exam: Initial FINDINGS: The trachea is midline. The cardiac silhouette is unremarkable. There is hyperinflation of the lung fields. There is linear atelectasis or scarring in the right mid lung field which is unchanged. No obvious acute superimposed process. Chronic changes in the right lung base. Stable chest.  Scarring or atelectasis in the right mid lung field. No acute pulmonary process. CTA PULMONARY W CONTRAST    Result Date: 12/1/2021  EXAMINATION: CTA OF THE CHEST 12/1/2021 11:19 am TECHNIQUE: CTA of the chest was performed after the administration of intravenous contrast.  Multiplanar reformatted images are provided for review. MIP images are provided for review. Dose modulation, iterative reconstruction, and/or weight based adjustment of the mA/kV was utilized to reduce the radiation dose to as low as reasonably achievable.  COMPARISON: 08/29/2018 HISTORY: ORDERING SYSTEM PROVIDED HISTORY: tachycardic, hypoxia, ro pe or acute cardiopulmonary process TECHNOLOGIST PROVIDED HISTORY: Reason for exam:->tachycardic, hypoxia, ro pe or acute cardiopulmonary process Decision Support Exception - unselect if not a suspected or confirmed emergency medical condition->Emergency Medical Condition (MA) Reason for Exam: Shortness of Breath FINDINGS: Pulmonary Arteries: The pulmonary arteries are adequately opacified. There is a filling defect identified within the right middle lobe pulmonary artery (series 5, image 168). No definite additional filling defects are identified. The right ventricle is enlarged relative to the left, but this is a chronic finding, unchanged when compared to the previous exam from 2018, and likely relates to chronic pulmonary hypertension rather than to acute right heart strain. Mediastinum: Visualized thyroid unremarkable. No mediastinal or hilar lymphadenopathy. Esophagus unremarkable. Thoracic aorta is normal in caliber. No evidence of dissection. Lungs/pleura: There is redemonstration of a band of atelectasis/scarring along the minor fissure, not substantially changed when compared to the 2018 exam.  No acute focal infiltrate is identified. Severe emphysematous changes are present. Upper Abdomen: The ascites is noted within the upper abdomen. Limited imaging of the upper abdomen otherwise unremarkable. Soft Tissues/Bones: The patient is cachectic. No acute bony abnormalities are identified. Positive for pulmonary embolism, with a single small embolus found in the right middle lobe. The right ventricle is larger than the left, but that is a chronic finding, and is almost certainly secondary to chronic pulmonary hypertension. Right heart strain is considered very unlikely, especially given the very limited clot burden. Severe emphysema. Small volume ascites in the upper abdomen.            EKG ordered        Electronically signed by JOSÉ MIGUEL Whitt CNP on 12/1/2021 at 3:48 PM

## 2021-12-02 PROBLEM — E43 SEVERE MALNUTRITION (HCC): Chronic | Status: ACTIVE | Noted: 2021-12-02

## 2021-12-02 LAB
APTT: >240 SECONDS (ref 25.1–37.1)
APTT: >240 SECONDS (ref 25.1–37.1)
EKG ATRIAL RATE: 288 BPM
EKG DIAGNOSIS: NORMAL
EKG Q-T INTERVAL: 332 MS
EKG QRS DURATION: 70 MS
EKG QTC CALCULATION (BAZETT): 505 MS
EKG R AXIS: 88 DEGREES
EKG T AXIS: 253 DEGREES
EKG VENTRICULAR RATE: 139 BPM
FOLATE: 19.8 NG/ML (ref 3.1–17.5)
GLUCOSE BLD-MCNC: 168 MG/DL (ref 70–99)
HAV IGM SER IA-ACNC: NON REACTIVE
HCT VFR BLD CALC: 38.6 % (ref 42–52)
HEMOGLOBIN: 11.5 GM/DL (ref 13.5–18)
HEPATITIS B CORE IGM ANTIBODY: NON REACTIVE
HEPATITIS B SURFACE ANTIGEN: NON REACTIVE
HEPATITIS C ANTIBODY: ABNORMAL
IRON: 51 UG/DL (ref 59–158)
LACTATE: 1.4 MMOL/L (ref 0.4–2)
LACTATE: 2.4 MMOL/L (ref 0.4–2)
LACTATE: 3.1 MMOL/L (ref 0.4–2)
LV EF: 53 %
LVEF MODALITY: NORMAL
MAGNESIUM: 2 MG/DL (ref 1.8–2.4)
MCH RBC QN AUTO: 30.7 PG (ref 27–31)
MCHC RBC AUTO-ENTMCNC: 29.8 % (ref 32–36)
MCV RBC AUTO: 103.2 FL (ref 78–100)
PCT TRANSFERRIN: 34 % (ref 10–44)
PDW BLD-RTO: 12.2 % (ref 11.7–14.9)
PLATELET # BLD: 123 K/CU MM (ref 140–440)
PMV BLD AUTO: 11.3 FL (ref 7.5–11.1)
RBC # BLD: 3.74 M/CU MM (ref 4.6–6.2)
TOTAL IRON BINDING CAPACITY: 150 UG/DL (ref 250–450)
TROPONIN T: 0.04 NG/ML
TROPONIN T: 0.04 NG/ML
TROPONIN T: <0.01 NG/ML
UNSATURATED IRON BINDING CAPACITY: 99 UG/DL (ref 110–370)
VITAMIN B-12: 1395 PG/ML (ref 211–911)
WBC # BLD: 3.6 K/CU MM (ref 4–10.5)

## 2021-12-02 PROCEDURE — 99233 SBSQ HOSP IP/OBS HIGH 50: CPT | Performed by: INTERNAL MEDICINE

## 2021-12-02 PROCEDURE — 2580000003 HC RX 258: Performed by: NURSE PRACTITIONER

## 2021-12-02 PROCEDURE — 6370000000 HC RX 637 (ALT 250 FOR IP): Performed by: NURSE PRACTITIONER

## 2021-12-02 PROCEDURE — 94640 AIRWAY INHALATION TREATMENT: CPT

## 2021-12-02 PROCEDURE — 83540 ASSAY OF IRON: CPT

## 2021-12-02 PROCEDURE — 80074 ACUTE HEPATITIS PANEL: CPT

## 2021-12-02 PROCEDURE — 82962 GLUCOSE BLOOD TEST: CPT

## 2021-12-02 PROCEDURE — 80053 COMPREHEN METABOLIC PANEL: CPT

## 2021-12-02 PROCEDURE — 2580000003 HC RX 258: Performed by: INTERNAL MEDICINE

## 2021-12-02 PROCEDURE — 85730 THROMBOPLASTIN TIME PARTIAL: CPT

## 2021-12-02 PROCEDURE — 83735 ASSAY OF MAGNESIUM: CPT

## 2021-12-02 PROCEDURE — 6370000000 HC RX 637 (ALT 250 FOR IP): Performed by: INTERNAL MEDICINE

## 2021-12-02 PROCEDURE — 93306 TTE W/DOPPLER COMPLETE: CPT

## 2021-12-02 PROCEDURE — 2500000003 HC RX 250 WO HCPCS: Performed by: INTERNAL MEDICINE

## 2021-12-02 PROCEDURE — 6360000002 HC RX W HCPCS: Performed by: HOSPITALIST

## 2021-12-02 PROCEDURE — 93010 ELECTROCARDIOGRAM REPORT: CPT | Performed by: INTERNAL MEDICINE

## 2021-12-02 PROCEDURE — 99213 OFFICE O/P EST LOW 20 MIN: CPT

## 2021-12-02 PROCEDURE — 84484 ASSAY OF TROPONIN QUANT: CPT

## 2021-12-02 PROCEDURE — 85027 COMPLETE CBC AUTOMATED: CPT

## 2021-12-02 PROCEDURE — 94761 N-INVAS EAR/PLS OXIMETRY MLT: CPT

## 2021-12-02 PROCEDURE — 93005 ELECTROCARDIOGRAM TRACING: CPT | Performed by: NURSE PRACTITIONER

## 2021-12-02 PROCEDURE — 2000000000 HC ICU R&B

## 2021-12-02 PROCEDURE — 83605 ASSAY OF LACTIC ACID: CPT

## 2021-12-02 PROCEDURE — 6360000002 HC RX W HCPCS: Performed by: NURSE PRACTITIONER

## 2021-12-02 PROCEDURE — 82746 ASSAY OF FOLIC ACID SERUM: CPT

## 2021-12-02 PROCEDURE — 82607 VITAMIN B-12: CPT

## 2021-12-02 PROCEDURE — 83550 IRON BINDING TEST: CPT

## 2021-12-02 PROCEDURE — 36415 COLL VENOUS BLD VENIPUNCTURE: CPT

## 2021-12-02 PROCEDURE — 2700000000 HC OXYGEN THERAPY PER DAY

## 2021-12-02 RX ORDER — ONDANSETRON 2 MG/ML
4 INJECTION INTRAMUSCULAR; INTRAVENOUS EVERY 6 HOURS PRN
Status: DISCONTINUED | OUTPATIENT
Start: 2021-12-02 | End: 2021-12-03 | Stop reason: HOSPADM

## 2021-12-02 RX ORDER — SODIUM CHLORIDE 0.9 % (FLUSH) 0.9 %
5-40 SYRINGE (ML) INJECTION PRN
Status: DISCONTINUED | OUTPATIENT
Start: 2021-12-02 | End: 2021-12-03 | Stop reason: HOSPADM

## 2021-12-02 RX ORDER — DIGOXIN 0.25 MG/ML
125 INJECTION INTRAMUSCULAR; INTRAVENOUS ONCE
Status: COMPLETED | OUTPATIENT
Start: 2021-12-02 | End: 2021-12-02

## 2021-12-02 RX ORDER — ALBUTEROL SULFATE 90 UG/1
2 AEROSOL, METERED RESPIRATORY (INHALATION) EVERY 6 HOURS PRN
Status: DISCONTINUED | OUTPATIENT
Start: 2021-12-02 | End: 2021-12-03 | Stop reason: HOSPADM

## 2021-12-02 RX ORDER — ASPIRIN 81 MG/1
81 TABLET ORAL DAILY
Status: DISCONTINUED | OUTPATIENT
Start: 2021-12-02 | End: 2021-12-03

## 2021-12-02 RX ORDER — DILTIAZEM HYDROCHLORIDE 120 MG/1
120 CAPSULE, COATED, EXTENDED RELEASE ORAL DAILY
Status: DISCONTINUED | OUTPATIENT
Start: 2021-12-02 | End: 2021-12-02

## 2021-12-02 RX ORDER — FAMOTIDINE 20 MG/1
20 TABLET, FILM COATED ORAL 2 TIMES DAILY
Status: DISCONTINUED | OUTPATIENT
Start: 2021-12-02 | End: 2021-12-03 | Stop reason: HOSPADM

## 2021-12-02 RX ORDER — ONDANSETRON 4 MG/1
4 TABLET, ORALLY DISINTEGRATING ORAL EVERY 8 HOURS PRN
Status: DISCONTINUED | OUTPATIENT
Start: 2021-12-02 | End: 2021-12-03 | Stop reason: HOSPADM

## 2021-12-02 RX ORDER — 0.9 % SODIUM CHLORIDE 0.9 %
500 INTRAVENOUS SOLUTION INTRAVENOUS ONCE
Status: COMPLETED | OUTPATIENT
Start: 2021-12-02 | End: 2021-12-02

## 2021-12-02 RX ORDER — METOPROLOL TARTRATE 5 MG/5ML
5 INJECTION INTRAVENOUS ONCE
Status: COMPLETED | OUTPATIENT
Start: 2021-12-02 | End: 2021-12-02

## 2021-12-02 RX ORDER — SODIUM CHLORIDE 9 MG/ML
25 INJECTION, SOLUTION INTRAVENOUS PRN
Status: DISCONTINUED | OUTPATIENT
Start: 2021-12-02 | End: 2021-12-03 | Stop reason: HOSPADM

## 2021-12-02 RX ORDER — METOPROLOL SUCCINATE 25 MG/1
25 TABLET, EXTENDED RELEASE ORAL DAILY
Status: DISCONTINUED | OUTPATIENT
Start: 2021-12-02 | End: 2021-12-03 | Stop reason: HOSPADM

## 2021-12-02 RX ORDER — BUDESONIDE AND FORMOTEROL FUMARATE DIHYDRATE 160; 4.5 UG/1; UG/1
2 AEROSOL RESPIRATORY (INHALATION) 2 TIMES DAILY
Status: DISCONTINUED | OUTPATIENT
Start: 2021-12-02 | End: 2021-12-03 | Stop reason: HOSPADM

## 2021-12-02 RX ORDER — ACETAMINOPHEN 325 MG/1
650 TABLET ORAL EVERY 6 HOURS PRN
Status: DISCONTINUED | OUTPATIENT
Start: 2021-12-02 | End: 2021-12-03 | Stop reason: HOSPADM

## 2021-12-02 RX ORDER — SODIUM CHLORIDE 0.9 % (FLUSH) 0.9 %
5-40 SYRINGE (ML) INJECTION EVERY 12 HOURS SCHEDULED
Status: DISCONTINUED | OUTPATIENT
Start: 2021-12-02 | End: 2021-12-03 | Stop reason: HOSPADM

## 2021-12-02 RX ORDER — POLYETHYLENE GLYCOL 3350 17 G/17G
17 POWDER, FOR SOLUTION ORAL DAILY PRN
Status: DISCONTINUED | OUTPATIENT
Start: 2021-12-02 | End: 2021-12-03 | Stop reason: HOSPADM

## 2021-12-02 RX ORDER — MAGNESIUM SULFATE 1 G/100ML
1000 INJECTION INTRAVENOUS PRN
Status: DISCONTINUED | OUTPATIENT
Start: 2021-12-02 | End: 2021-12-03 | Stop reason: HOSPADM

## 2021-12-02 RX ORDER — ACETAMINOPHEN 650 MG/1
650 SUPPOSITORY RECTAL EVERY 6 HOURS PRN
Status: DISCONTINUED | OUTPATIENT
Start: 2021-12-02 | End: 2021-12-03 | Stop reason: HOSPADM

## 2021-12-02 RX ADMIN — DIGOXIN 125 MCG: 250 INJECTION, SOLUTION INTRAMUSCULAR; INTRAVENOUS; PARENTERAL at 10:50

## 2021-12-02 RX ADMIN — SODIUM CHLORIDE, PRESERVATIVE FREE 5 ML: 5 INJECTION INTRAVENOUS at 10:15

## 2021-12-02 RX ADMIN — APIXABAN 10 MG: 5 TABLET, FILM COATED ORAL at 20:20

## 2021-12-02 RX ADMIN — FAMOTIDINE 20 MG: 20 TABLET ORAL at 20:20

## 2021-12-02 RX ADMIN — TIOTROPIUM BROMIDE INHALATION SPRAY 2 PUFF: 3.12 SPRAY, METERED RESPIRATORY (INHALATION) at 08:59

## 2021-12-02 RX ADMIN — BUDESONIDE AND FORMOTEROL FUMARATE DIHYDRATE 2 PUFF: 160; 4.5 AEROSOL RESPIRATORY (INHALATION) at 08:59

## 2021-12-02 RX ADMIN — METOPROLOL SUCCINATE 25 MG: 25 TABLET, EXTENDED RELEASE ORAL at 09:56

## 2021-12-02 RX ADMIN — METOPROLOL TARTRATE 5 MG: 5 INJECTION INTRAVENOUS at 09:55

## 2021-12-02 RX ADMIN — SODIUM CHLORIDE 500 ML: 9 INJECTION, SOLUTION INTRAVENOUS at 15:40

## 2021-12-02 RX ADMIN — ONDANSETRON 4 MG: 2 INJECTION INTRAMUSCULAR; INTRAVENOUS at 18:36

## 2021-12-02 RX ADMIN — SODIUM CHLORIDE, PRESERVATIVE FREE 10 ML: 5 INJECTION INTRAVENOUS at 20:20

## 2021-12-02 RX ADMIN — FAMOTIDINE 20 MG: 20 TABLET ORAL at 09:56

## 2021-12-02 ASSESSMENT — PAIN SCALES - GENERAL: PAINLEVEL_OUTOF10: 0

## 2021-12-02 NOTE — PROGRESS NOTES
Rapid response called as tachy. -140s. Recived EKG and shows Afib with RVR. Ordered dig.  Labs needed and correct mag

## 2021-12-02 NOTE — ED NOTES
Pt was incontinent of bowels and bladder. Full bed change completed at this time. Pt is now in a position of comfort/safety, the call light is within reach, and the patient denies any other needs at this time.       Stephon File  12/01/21 2014

## 2021-12-02 NOTE — CONSULTS
[address] Continence Nurse  Consult Note       Fox Gonsalez AGE: 74 y.o. GENDER: male  : 1955  TODAY'S DATE:  2021    Subjective:     Reason for  Evaluation and Assessment: wound care june Gonsalez is a 74 y.o. male referred by:   [x] Physician  [] Nursing  [] Other:     Wound Identification:  Wound Type: pressure and skin tear  Contributing Factors: chronic pressure, decreased mobility, malnutrition and incontinence of stool        PAST MEDICAL HISTORY        Diagnosis Date    Hepatitis C without hepatic coma     Hyperlipidemia     Hypertension        PAST SURGICAL HISTORY    Past Surgical History:   Procedure Laterality Date    CARDIAC CATHETERIZATION  10/25/2018       FAMILY HISTORY    Family History   Problem Relation Age of Onset    Hypertension Mother     Diabetes Sister     Hypertension Brother        SOCIAL HISTORY    Social History     Tobacco Use    Smoking status: Current Some Day Smoker     Types: Cigarettes    Smokeless tobacco: Never Used    Tobacco comment: smokes 1 cig once in a blue moon   Substance Use Topics    Alcohol use: No    Drug use: Yes     Types: Marijuana (Weed)       ALLERGIES    Allergies   Allergen Reactions    Morphine        MEDICATIONS    No current facility-administered medications on file prior to encounter.      Current Outpatient Medications on File Prior to Encounter   Medication Sig Dispense Refill    INCRUSE ELLIPTA 62.5 MCG/INH AEPB INHALE ONE PUFF BY MOUTH EVERY DAY 30 each 4    budesonide-formoterol (SYMBICORT) 160-4.5 MCG/ACT AERO TAKE 2 PUFFS BY MOUTH TWICE A DAY 1 Inhaler 3    tiotropium (SPIRIVA RESPIMAT) 2.5 MCG/ACT AERS inhaler Inhale 2 puffs into the lungs daily 1 Inhaler 3    albuterol sulfate HFA (VENTOLIN HFA) 108 (90 Base) MCG/ACT inhaler Inhale 2 puffs into the lungs every 6 hours as needed for Wheezing 1 Inhaler 3    furosemide (LASIX) 40 MG tablet Take 1 tablet by mouth daily 30 tablet 0    potassium chloride (KLOR-CON M) 20 MEQ extended release tablet Take 1 tablet by mouth daily 30 tablet 0    cloNIDine (CATAPRES) 0.2 MG tablet Take 0.2 mg by mouth every 8 hours      lisinopril (PRINIVIL;ZESTRIL) 40 MG tablet Take 40 mg by mouth daily      metoprolol tartrate (LOPRESSOR) 50 MG tablet Take 50 mg by mouth 2 times daily      aspirin 81 MG EC tablet Take 81 mg by mouth daily           Objective:      BP (!) 118/107   Pulse 102   Temp 98.2 °F (36.8 °C) (Oral)   Resp 12   Ht 5' 5\" (1.651 m)   Wt 90 lb (40.8 kg)   SpO2 96%   BMI 14.98 kg/m²   Juan Pablo Risk Score: Juan Pablo Scale Score: 13    LABS    CBC:   Lab Results   Component Value Date    WBC 3.6 12/01/2021    RBC 4.01 12/01/2021    HGB 12.5 12/01/2021    HCT 41.1 12/01/2021    .5 12/01/2021    MCH 31.2 12/01/2021    MCHC 30.4 12/01/2021    RDW 12.0 12/01/2021     12/01/2021    MPV 10.9 12/01/2021     CMP:    Lab Results   Component Value Date     12/01/2021    K 4.4 12/01/2021     12/01/2021    CO2 33 12/01/2021    BUN 26 12/01/2021    CREATININE 0.6 12/01/2021    GFRAA >60 12/01/2021    LABGLOM >60 12/01/2021    GLUCOSE 119 12/01/2021    PROT 5.0 12/01/2021    LABALBU 2.5 12/01/2021    CALCIUM 8.0 12/01/2021    BILITOT 0.2 12/01/2021    ALKPHOS 132 12/01/2021    AST 82 12/01/2021     12/01/2021     Albumin:    Lab Results   Component Value Date    LABALBU 2.5 12/01/2021     PT/INR:    Lab Results   Component Value Date    PROTIME 11.7 10/22/2018    INR 1.03 10/22/2018     HgBA1c:  No results found for: LABA1C      Assessment:     Patient Active Problem List   Diagnosis    CHF (congestive heart failure), NYHA class III, unspecified failure chronicity, combined (HCC)    Congestive heart failure (HCC)    Anasarca    Other ascites    Hypoxia    Chronic obstructive pulmonary disease (HCC)    SOB (shortness of breath) on exertion    Pulmonary hypertension (HCC)    Cigarette smoker    Sleep related hypoxia  Acute pulmonary embolism (HCC)    Severe malnutrition (HCC)       Measurements:  Wound 12/02/21 Sacrum (Active)   Wound Image   12/02/21 1430   Wound Etiology Pressure Stage  2 12/02/21 1430   Dressing Status New dressing applied 12/02/21 1430   Wound Cleansed Cleansed with saline 12/02/21 1430   Dressing/Treatment Collagen; Silicone border 00/07/36 1430   Wound Length (cm) 1.8 cm 12/02/21 1430   Wound Width (cm) 3 cm 12/02/21 1430   Wound Depth (cm) 0.1 cm 12/02/21 1430   Wound Surface Area (cm^2) 5.4 cm^2 12/02/21 1430   Wound Volume (cm^3) 0.54 cm^3 12/02/21 1430   Distance Tunneling (cm) 0 cm 12/02/21 1430   Tunneling Position ___ O'Clock 0 12/02/21 1430   Undermining Starts ___ O'Clock 0 12/02/21 1430   Undermining Ends___ O'Clock 0 12/02/21 1430   Undermining Maxium Distance (cm) 0 12/02/21 1430   Drainage Amount Moderate 12/02/21 1430   Drainage Description Serosanguinous 12/02/21 1430   Odor None 12/02/21 1430   Susana-wound Assessment Intact 12/02/21 1430   Margins Defined edges 12/02/21 1430   Wound Thickness Description not for Pressure Injury Partial thickness 12/02/21 1430   Number of days: 0       Wound 12/02/21 Pretibial Right; Lateral (Active)   Wound Image   12/02/21 1430   Dressing Status New dressing applied 12/02/21 1430   Wound Cleansed Cleansed with saline 12/02/21 1430   Dressing/Treatment Collagen; Silicone border 94/03/37 1430   Wound Length (cm) 3 cm 12/02/21 1430   Wound Width (cm) 3 cm 12/02/21 1430   Wound Depth (cm) 0.1 cm 12/02/21 1430   Wound Surface Area (cm^2) 9 cm^2 12/02/21 1430   Wound Volume (cm^3) 0.9 cm^3 12/02/21 1430   Distance Tunneling (cm) 0 cm 12/02/21 1430   Tunneling Position ___ O'Clock 0 12/02/21 1430   Undermining Starts ___ O'Clock 0 12/02/21 1430   Undermining Ends___ O'Clock 0 12/02/21 1430   Undermining Maxium Distance (cm) 0 12/02/21 1430   Drainage Amount Moderate 12/02/21 1430   Drainage Description Serosanguinous 12/02/21 1430   Odor None 12/02/21 1430 Susana-wound Assessment Intact 12/02/21 1430   Margins Defined edges 12/02/21 1430   Wound Thickness Description not for Pressure Injury Partial thickness 12/02/21 1430   Number of days: 0       Wound 12/02/21 Foot Anterior;  Left cluster (Active)   Wound Image   12/02/21 1430   Dressing Status New dressing applied 12/02/21 1430   Wound Cleansed Cleansed with saline 12/02/21 1430   Wound Length (cm) 2.5 cm 12/02/21 1430   Wound Width (cm) 2.5 cm 12/02/21 1430   Wound Depth (cm) 0.1 cm 12/02/21 1430   Wound Surface Area (cm^2) 6.25 cm^2 12/02/21 1430   Wound Volume (cm^3) 0.625 cm^3 12/02/21 1430   Distance Tunneling (cm) 0 cm 12/02/21 1430   Tunneling Position ___ O'Clock 0 12/02/21 1430   Undermining Starts ___ O'Clock 0 12/02/21 1430   Undermining Ends___ O'Clock 0 12/02/21 1430   Undermining Maxium Distance (cm) 0 12/02/21 1430   Drainage Amount Moderate 12/02/21 1430   Drainage Description Serosanguinous 12/02/21 1430   Odor None 12/02/21 1430   Susana-wound Assessment Intact 12/02/21 1430   Margins Defined edges 12/02/21 1430   Wound Thickness Description not for Pressure Injury Partial thickness 12/02/21 1430   Number of days: 0       Wound 12/02/21 Ankle Anterior; Right cluster (Active)   Wound Image   12/02/21 1430   Dressing Status New dressing applied 12/02/21 1430   Wound Cleansed Cleansed with saline 12/02/21 1430   Wound Length (cm) 6 cm 12/02/21 1430   Wound Width (cm) 7 cm 12/02/21 1430   Wound Depth (cm) 0.1 cm 12/02/21 1430   Wound Surface Area (cm^2) 42 cm^2 12/02/21 1430   Wound Volume (cm^3) 4.2 cm^3 12/02/21 1430   Distance Tunneling (cm) 0 cm 12/02/21 1430   Tunneling Position ___ O'Clock 0 12/02/21 1430   Undermining Starts ___ O'Clock 0 12/02/21 1430   Undermining Ends___ O'Clock 0 12/02/21 1430   Undermining Maxium Distance (cm) 0 12/02/21 1430   Drainage Amount Moderate 12/02/21 1430   Drainage Description Serous 12/02/21 1430   Odor None 12/02/21 1430   Susana-wound Assessment Intact 12/02/21 1430   Margins Defined edges 12/02/21 1430   Wound Thickness Description not for Pressure Injury Partial thickness 12/02/21 1430   Number of days: 0       Response to treatment:  Well tolerated by patient. Pain Assessment:  Severity:  none  Quality of pain:   Wound Pain Timing/Severity:   Premedicated: none    Plan:     Plan of Care: Wound 12/02/21 Sacrum-Dressing/Treatment: Collagen, Silicone border  Wound 12/02/21 Pretibial Right; Lateral-Dressing/Treatment: Collagen, Silicone border  Wound 12/02/21 Foot Anterior; Left cluster-Dressing/Treatment:  (optifoam border)  Wound 12/02/21 Ankle Anterior; Right cluster-Dressing/Treatment:  (optifoam border)     Patient in bed agreeable to wound care eval. Pt has dry/flaky skin washed legs/feet with bath oil. Heels intact. Open wound noted to rt lateral leg opened blister/skin tear. Cleansed with NS measured and pictured applied dressing as above. Rt anterior ankle cluster/left dorsal foot opened blisters/skin tears cleansed with NS measured and pictured. Applied skin prep and optifoam borders. Sacrum has wound pressure stage 2. Cleansed with NS. Measured and pictured. Applied puracol and silicone border. Pt incontinent of stool. Susana care done depends and bed pad changed. Pt turned to lt side. Heels floated with a pillow. Pt is at moderate risk for skin breakdown A[facility] [place]. Follow [place] orders. Specialty Bed Required :  yes  [x] Low Air Loss   [] Pressure Redistribution  [] Fluid Immersion  [] Bariatric  [] Total Pressure Relief  [] Other:     Discharge Plan:  Placement for patient upon discharge: tbd  Hospice Care: no  Patient appropriate for Outpatient [address]: [facility]    Patient/Caregiver Teaching:  Level of patient/caregiver understanding able to:  Cares explained as given. No evidence of learning. Electronically signed by Yessica Oropeza.  ZIYAD Chu,  on 12/2/2021 at 4:12 PM

## 2021-12-02 NOTE — PROGRESS NOTES
Lab called and stated that aPTT that was sent at 0415 was contaminated.  Lab at bedside at this time

## 2021-12-02 NOTE — PLAN OF CARE
Nutrition Problem #1: Severe malnutrition, In context of chronic illness  Intervention: Food and/or Nutrient Delivery: Continue Current Diet, Start Oral Nutrition Supplement  Nutritional Goals: Pt will consume at least 50% of meals and supplements during stay

## 2021-12-02 NOTE — PROGRESS NOTES
Dr. Kyla Severe notified pt HR and heparin drip and lab not having new aPTT value.  No new order at this time

## 2021-12-02 NOTE — PROGRESS NOTES
CARDIOLOGY PROGRESS NOTE                                                  Name:  Stephen Foster. /Age/Sex: 1955  (75 y.o. male)   MRN & CSN:  [id number] & [id number] Admission Date/Time: 2021 10:08 AM   Location:  [facility] PCP: Adam Barnes Dr Date:  2021  Hospital Day: 2      SUBJECTIVE:   Seen patient as follow up as consultation for PE     No chest pain. + shortness of breath  No palpations    TELEMETRY:  Sinus tachycardia      Assessment/Plan:         1. Acute pulmonary embolism: CT scan reviewed. No saddle or central PE small PE noted in the right lung. Stop IV heparin  Start patient on Eliquis  Eliquis 10 twice daily for 7 days then 5 twice daily onwards  Echo pending \    2. Sinus tachycardia : Start patient on oral beta blocker TOPROL XL   3. History of severe pulmonary hypertension: S/p right heart cath in 2018. Patient unfortunately failed to follow-up. Will obtain echo. Diuretics as needed  4. History of cardiomyopathy with prior EF of 35%. Start patient on Toprol-XL  5. Severe protein energy malnutrition with BMI of 15  ?? Malignancy work-up as per primary team        Past medical history:    has a past medical history of Hepatitis C without hepatic coma, Hyperlipidemia, and Hypertension. Past surgical history:   has a past surgical history that includes Cardiac catheterization (10/25/2018). Social History:   reports that he has been smoking cigarettes. He has never used smokeless tobacco. He reports current drug use. Drug: Marijuana [place]). He reports that he does not drink alcohol. Family history:  family history includes Diabetes in his sister; Hypertension in his brother and mother.     OBJECTIVE:     /88   Pulse 115   Temp 98.4 °F (36.9 °C) (Oral)   Resp 16   Ht 5' 5\" (1.651 m)   Wt 90 lb (40.8 kg)   SpO2 95%   BMI 14.98 kg/m²   No intake or output data in the 24 hours ending 21 0843    Physical Exam:    Constitutional: Poorly developed  HENT:  Normocephalic, Atraumatic, Bilateral external ears normal, Oropharynx moist, No oral exudates, Nose normal. Neck- Normal range of motion, No tenderness, Supple, No stridor. Eyes:  EOMI, Conjunctiva normal, No discharge. Respiratory:  Normal breath sounds, No respiratory distress, No wheezing, No chest tenderness. ,no use of accessory muscles, diaphragm movement is normal  Cardiovascular S1-S2 No Murmurs, added sounds. Fast rate rhythm. No rubs gallops. Carotid pulses and amplitude are normal no bruit noted. Pedal pulses normal femoral pulses normal.  No pedal edema  GI:  Bowel sounds normal, Soft, No tenderness  : No CVA tenderness. Musculoskeletal: No edema, No tenderness, No cyanosis, No clubbing. Back- No tenderness. Integument:  Warm, Dry, No erythema, No rash. Lymphatic:  No lymphadenopathy noted. Neurologic:  Alert & oriented x 3, No focal deficits noted. Psychiatric:  Affect normal, Judgment normal, Mood normal.           MEDICATIONS:     sodium chloride flush  5-40 mL IntraVENous 2 times per day    famotidine  20 mg Oral BID    aspirin  81 mg Oral Daily    budesonide-formoterol  2 puff Inhalation BID    tiotropium  2 puff Inhalation Daily      sodium chloride       sodium chloride flush, sodium chloride, ondansetron **OR** ondansetron, polyethylene glycol, acetaminophen **OR** acetaminophen, albuterol sulfate HFA, heparin (porcine), heparin (porcine)  Allergies   Allergen Reactions    Morphine        Lab Data:  CBC:   Recent Labs     12/01/21  1046 12/01/21  1706   WBC 3.4* 3.6*   HGB 11.9* 12.5*   HCT 39.4* 41.1*   .6* 102.5*   * 123*     BMP:   Recent Labs     12/01/21  1046   *   K 4.4      CO2 33*   BUN 26*   CREATININE 0.6*     LIVER PROFILE:   Recent Labs     12/01/21  1046   AST 82*   *   BILITOT 0.2   ALKPHOS 132*     PT/INR: No results for input(s): PROTIME, INR in the last 72 hours.   APTT:   Recent Labs 12/01/21  1706   APTT 23.9*          Esther Morales MD, MD 12/2/2021 8:43 AM

## 2021-12-02 NOTE — PROGRESS NOTES
Skin assessment done by this nurse and Stan JEAN-BAPTISTE. Skin is dry and flaky. Open Wound on coccyx . Skin tears on rt ankle and leg and on the top of left foot.

## 2021-12-02 NOTE — PROGRESS NOTES
Dr. Aruna Rodgers notified that pt heparin drip has not been titrated due to labs not being collected/resulted/drawn but this RN has sent.  Also he was notified of pt HR

## 2021-12-02 NOTE — CONSULTS
Comprehensive Nutrition Assessment    Type and Reason for Visit:  Initial, Positive Nutrition Screen, Wound (Poor intake, weight loss)    Nutrition Recommendations/Plan:   · Continue current diet   · Start variety of high protein oral nutrition supplement TID   · Assist with set up and feeding prn for mealtime  · Will monitor GI status, appetite changes, weight trends, and poc   · If patient does not consume more than 50% of estimated needs via po intake, consider early EN initiation as pt is severely malnourished. Nutrition Assessment:  Admitted with acute pulmonary embolism, shortness of breath, weakness. Hx of CAD, CHF. Pt on cardiac restricted 2 gm Na diet. Pt was laying down, eating a tuna salad sandwiche, reports first meal since admission. Appears frail, weak, and tired. Receives MOW PTA at least once per week. Per chart, pt is chronically underweight, gradual weight loss, UBW ranges between 95-100s over last 1-2 years. Discussed small, more frequent meals with adequate protein for healing. Willing to optimize nutriton by supplements. Follow as high nutrition risk as pt meets criteria for severe malnutrition. Malnutrition Assessment:  Malnutrition Status:  Severe malnutrition    Context:  Chronic Illness     Findings of the 6 clinical characteristics of malnutrition:  Energy Intake:  Unable to assess  Weight Loss:  1 - Mild weight loss (specify amount and time period)     Body Fat Loss:  7 - Severe body fat loss Orbital, Buccal region, Fat Overlying Ribs   Muscle Mass Loss:  7 - Severe muscle mass loss Clavicles (pectoralis & deltoids), Thigh (quadraceps), Calf (gastrocnemius), Scapula (trapezius)  Fluid Accumulation:  No significant fluid accumulation Extremities   Strength:  Not Performed    Estimated Daily Nutrient Needs:  Energy (kcal):  4300-7091 (33-38 kcals/kg); Weight Used for Energy Requirements:  Current     Protein (g):  50-62 (1.2-1.5 g/kg);  Weight Used for Protein Requirements: Ideal        Fluid (ml/day):  ~1500; Method Used for Fluid Requirements:  1 ml/kcal      Nutrition Related Findings:  POCT 168      Wounds:  Pressure Injury, Skin Tears, Open Wounds       Current Nutrition Therapies:    ADULT DIET;  Regular; Low Fat/Low Chol/High Fiber/2 gm Na    Anthropometric Measures:  · Height: 5' 5\" (165.1 cm)  · Current Body Weight: 89 lb 15.2 oz (40.8 kg)   · Admission Body Weight:  (stated)    · Usual Body Weight: 95 lb (43.1 kg) (August 2020)     · Ideal Body Weight: 136 lbs; % Ideal Body Weight 66.1 %   · BMI: 15  · Adjusted Body Weight:  ; No Adjustment   · Adjusted BMI:      · BMI Categories: Underweight (BMI less than 22) age over 72       Nutrition Diagnosis:   · Severe malnutrition, In context of chronic illness related to inadequate protein-energy intake as evidenced by severe loss of subcutaneous fat, severe muscle loss    Nutrition Interventions:   Food and/or Nutrient Delivery:  Continue Current Diet, Start Oral Nutrition Supplement  Nutrition Education/Counseling:  No recommendation at this time   Coordination of Nutrition Care:  Continue to monitor while inpatient, Feeding Assistance/Environment Change, Coordination of Community Care    Goals:  Pt will consume at least 50% of meals and supplements during stay       Nutrition Monitoring and Evaluation:   Behavioral-Environmental Outcomes:  None Identified   Food/Nutrient Intake Outcomes:  Food and Nutrient Intake, Supplement Intake  Physical Signs/Symptoms Outcomes:  Biochemical Data, GI Status, Meal Time Behavior, Skin, Weight, Nutrition Focused Physical Findings     Discharge Planning:    Assist with food insecurity     Electronically signed by Collis Spurling, RD, LD on 12/2/21 at 3:10 PM EST    Contact: [phone]

## 2021-12-03 ENCOUNTER — APPOINTMENT (OUTPATIENT)
Dept: CT IMAGING | Age: 66
DRG: 175 | End: 2021-12-03
Payer: OTHER GOVERNMENT

## 2021-12-03 VITALS
TEMPERATURE: 97.5 F | OXYGEN SATURATION: 89 % | SYSTOLIC BLOOD PRESSURE: 106 MMHG | HEART RATE: 113 BPM | WEIGHT: 90 LBS | BODY MASS INDEX: 14.99 KG/M2 | DIASTOLIC BLOOD PRESSURE: 94 MMHG | RESPIRATION RATE: 21 BRPM | HEIGHT: 65 IN

## 2021-12-03 LAB
ALBUMIN SERPL-MCNC: 2.1 GM/DL (ref 3.4–5)
ALBUMIN SERPL-MCNC: 2.5 GM/DL (ref 3.4–5)
ALP BLD-CCNC: 109 IU/L (ref 40–128)
ALP BLD-CCNC: 131 IU/L (ref 40–129)
ALT SERPL-CCNC: 109 U/L (ref 10–40)
ALT SERPL-CCNC: 88 U/L (ref 10–40)
AMMONIA: 54 UMOL/L (ref 16–60)
ANION GAP SERPL CALCULATED.3IONS-SCNC: 6 MMOL/L (ref 4–16)
ANION GAP SERPL CALCULATED.3IONS-SCNC: 8 MMOL/L (ref 4–16)
AST SERPL-CCNC: 60 IU/L (ref 15–37)
AST SERPL-CCNC: 72 IU/L (ref 15–37)
BILIRUB SERPL-MCNC: 0.1 MG/DL (ref 0–1)
BILIRUB SERPL-MCNC: 0.2 MG/DL (ref 0–1)
BUN BLDV-MCNC: 30 MG/DL (ref 6–23)
BUN BLDV-MCNC: 30 MG/DL (ref 6–23)
CALCIUM SERPL-MCNC: 7.6 MG/DL (ref 8.3–10.6)
CALCIUM SERPL-MCNC: 8 MG/DL (ref 8.3–10.6)
CHLORIDE BLD-SCNC: 102 MMOL/L (ref 99–110)
CHLORIDE BLD-SCNC: 104 MMOL/L (ref 99–110)
CHOLESTEROL: 110 MG/DL
CO2: 34 MMOL/L (ref 21–32)
CO2: 35 MMOL/L (ref 21–32)
CREAT SERPL-MCNC: 0.5 MG/DL (ref 0.9–1.3)
CREAT SERPL-MCNC: 0.5 MG/DL (ref 0.9–1.3)
EKG ATRIAL RATE: 116 BPM
EKG DIAGNOSIS: NORMAL
EKG P AXIS: 74 DEGREES
EKG P-R INTERVAL: 134 MS
EKG Q-T INTERVAL: 356 MS
EKG QRS DURATION: 64 MS
EKG QTC CALCULATION (BAZETT): 494 MS
EKG R AXIS: 96 DEGREES
EKG T AXIS: 94 DEGREES
EKG VENTRICULAR RATE: 116 BPM
GFR AFRICAN AMERICAN: >60 ML/MIN/1.73M2
GFR AFRICAN AMERICAN: >60 ML/MIN/1.73M2
GFR NON-AFRICAN AMERICAN: >60 ML/MIN/1.73M2
GFR NON-AFRICAN AMERICAN: >60 ML/MIN/1.73M2
GLUCOSE BLD-MCNC: 101 MG/DL (ref 70–99)
GLUCOSE BLD-MCNC: 106 MG/DL (ref 70–99)
GLUCOSE BLD-MCNC: 144 MG/DL (ref 70–99)
HDLC SERPL-MCNC: 43 MG/DL
LDL CHOLESTEROL DIRECT: 51 MG/DL
POTASSIUM SERPL-SCNC: 4.5 MMOL/L (ref 3.5–5.1)
POTASSIUM SERPL-SCNC: 4.8 MMOL/L (ref 3.5–5.1)
PREALBUMIN: 8 MG/DL (ref 20–40)
SODIUM BLD-SCNC: 144 MMOL/L (ref 135–145)
SODIUM BLD-SCNC: 145 MMOL/L (ref 135–145)
TOTAL PROTEIN: 4.4 GM/DL (ref 6.4–8.2)
TOTAL PROTEIN: 5.2 GM/DL (ref 6.4–8.2)
TRIGL SERPL-MCNC: 100 MG/DL
TROPONIN T: 0.03 NG/ML
TROPONIN T: 0.03 NG/ML

## 2021-12-03 PROCEDURE — 82962 GLUCOSE BLOOD TEST: CPT

## 2021-12-03 PROCEDURE — 93010 ELECTROCARDIOGRAM REPORT: CPT | Performed by: INTERNAL MEDICINE

## 2021-12-03 PROCEDURE — 80061 LIPID PANEL: CPT

## 2021-12-03 PROCEDURE — 82140 ASSAY OF AMMONIA: CPT

## 2021-12-03 PROCEDURE — 84484 ASSAY OF TROPONIN QUANT: CPT

## 2021-12-03 PROCEDURE — 83721 ASSAY OF BLOOD LIPOPROTEIN: CPT

## 2021-12-03 PROCEDURE — 70498 CT ANGIOGRAPHY NECK: CPT

## 2021-12-03 PROCEDURE — 80053 COMPREHEN METABOLIC PANEL: CPT

## 2021-12-03 PROCEDURE — 84134 ASSAY OF PREALBUMIN: CPT

## 2021-12-03 PROCEDURE — 93005 ELECTROCARDIOGRAM TRACING: CPT | Performed by: INTERNAL MEDICINE

## 2021-12-03 PROCEDURE — 36415 COLL VENOUS BLD VENIPUNCTURE: CPT

## 2021-12-03 PROCEDURE — 85027 COMPLETE CBC AUTOMATED: CPT

## 2021-12-03 PROCEDURE — 6360000004 HC RX CONTRAST MEDICATION: Performed by: INTERNAL MEDICINE

## 2021-12-03 PROCEDURE — 80048 BASIC METABOLIC PNL TOTAL CA: CPT

## 2021-12-03 RX ORDER — ATORVASTATIN CALCIUM 80 MG/1
80 TABLET, FILM COATED ORAL NIGHTLY
Status: DISCONTINUED | OUTPATIENT
Start: 2021-12-03 | End: 2021-12-03 | Stop reason: HOSPADM

## 2021-12-03 RX ORDER — ASPIRIN 81 MG/1
81 TABLET ORAL DAILY
Status: DISCONTINUED | OUTPATIENT
Start: 2021-12-03 | End: 2021-12-03 | Stop reason: HOSPADM

## 2021-12-03 RX ORDER — POLYETHYLENE GLYCOL 3350 17 G/17G
17 POWDER, FOR SOLUTION ORAL DAILY PRN
Status: DISCONTINUED | OUTPATIENT
Start: 2021-12-03 | End: 2021-12-03 | Stop reason: HOSPADM

## 2021-12-03 RX ORDER — METOPROLOL TARTRATE 5 MG/5ML
2.5 INJECTION INTRAVENOUS EVERY 4 HOURS PRN
Status: DISCONTINUED | OUTPATIENT
Start: 2021-12-03 | End: 2021-12-03 | Stop reason: HOSPADM

## 2021-12-03 RX ORDER — ONDANSETRON 2 MG/ML
4 INJECTION INTRAMUSCULAR; INTRAVENOUS EVERY 6 HOURS PRN
Status: DISCONTINUED | OUTPATIENT
Start: 2021-12-03 | End: 2021-12-03 | Stop reason: HOSPADM

## 2021-12-03 RX ORDER — ONDANSETRON 4 MG/1
4 TABLET, ORALLY DISINTEGRATING ORAL EVERY 8 HOURS PRN
Status: DISCONTINUED | OUTPATIENT
Start: 2021-12-03 | End: 2021-12-03 | Stop reason: HOSPADM

## 2021-12-03 RX ORDER — ASPIRIN 300 MG/1
300 SUPPOSITORY RECTAL DAILY
Status: DISCONTINUED | OUTPATIENT
Start: 2021-12-03 | End: 2021-12-03 | Stop reason: HOSPADM

## 2021-12-03 RX ORDER — SODIUM CHLORIDE 0.9 % (FLUSH) 0.9 %
5-40 SYRINGE (ML) INJECTION 2 TIMES DAILY
Status: DISCONTINUED | OUTPATIENT
Start: 2021-12-03 | End: 2021-12-03 | Stop reason: HOSPADM

## 2021-12-03 RX ADMIN — IOPAMIDOL 75 ML: 755 INJECTION, SOLUTION INTRAVENOUS at 06:37

## 2021-12-03 NOTE — PROGRESS NOTES
Pt son called and updated on current plan and events of this shift.     Electronically signed by Lila Navarrete RN on 12/3/2021 at 7:00 AM

## 2021-12-03 NOTE — PROGRESS NOTES
[facility] at bedside this time to transport patient to [facility]. Report given to Mehnaz Gilbert at bedside. Toni Marrero, pt son at bedside at this time.     Electronically signed by Terra Benson RN on 12/3/2021 at 7:37 AM

## 2021-12-03 NOTE — PROGRESS NOTES
[facility] bleed line being called at this time.      Electronically signed by Elizabeth Rodriguez RN on 12/3/2021 at 6:37 AM

## 2021-12-03 NOTE — PROGRESS NOTES
Telestroke in use with patient at this time.     Electronically signed by Isi Hunter RN on 12/3/2021 at 6:51 AM

## 2021-12-03 NOTE — PROGRESS NOTES
Dr. Ava Desai notified of Troponin 0.03.8.     Electronically signed by Elizabeth Rodriguez RN on 2021 at 11:21 PM

## 2021-12-03 NOTE — PROGRESS NOTES
[employer] tech called this RN to check on pt d/t HR being in the 160s-170s RVR. Message sent to Dr. Kimberlyn Hart for HR and order obtained for 2.5mg IV PRN for HR greater than 140. Concha JARAMILLO called to bedside for pt acting lethargic, confused, not following commands and not communicating as he previously was. ABG obtained, BG 13, pt taken to CT, pt back in room at this time.      Electronically signed by Amando Magana RN on 12/3/2021 at 6:35 AM

## 2021-12-03 NOTE — PROGRESS NOTES
Dr. Dejuan Tesfaye notified of pt HR jumping up into the 130s-150s RVR. Asked for a PRN medication to control HR at this time.     Electronically signed by Reji Mendez RN on 12/3/2021 at 5:53 AM

## 2021-12-03 NOTE — PROGRESS NOTES
Pt departed unit at this time with [facility]. Toney Tolentino, pt son took all of patients belongings.

## 2021-12-03 NOTE — PROGRESS NOTES
Access center called this RN and updated on accepting physician, Dr. Prem Joe. Left callback number if anything is needed [phone].     Electronically signed by Bryan Elizalde RN on 12/3/2021 at 7:15 AM

## 2021-12-03 NOTE — PROGRESS NOTES
Radiology called at this time and requested to speak to physician. Phone given to Dr. Vikas Cronin.      Electronically signed by Rachana Conroy RN on 12/3/2021 at 6:42 AM